# Patient Record
Sex: FEMALE | Race: WHITE | NOT HISPANIC OR LATINO | Employment: FULL TIME | URBAN - METROPOLITAN AREA
[De-identification: names, ages, dates, MRNs, and addresses within clinical notes are randomized per-mention and may not be internally consistent; named-entity substitution may affect disease eponyms.]

---

## 2018-09-20 ENCOUNTER — APPOINTMENT (OUTPATIENT)
Dept: URGENT CARE | Facility: MEDICAL CENTER | Age: 63
End: 2018-09-20
Payer: OTHER MISCELLANEOUS

## 2018-09-20 ENCOUNTER — APPOINTMENT (OUTPATIENT)
Dept: RADIOLOGY | Facility: MEDICAL CENTER | Age: 63
End: 2018-09-20
Payer: OTHER MISCELLANEOUS

## 2018-09-20 DIAGNOSIS — M25.462 PAIN AND SWELLING OF LEFT KNEE: Primary | ICD-10-CM

## 2018-09-20 DIAGNOSIS — M25.562 PAIN AND SWELLING OF LEFT KNEE: ICD-10-CM

## 2018-09-20 DIAGNOSIS — M25.462 PAIN AND SWELLING OF LEFT KNEE: ICD-10-CM

## 2018-09-20 DIAGNOSIS — M25.562 PAIN AND SWELLING OF LEFT KNEE: Primary | ICD-10-CM

## 2018-09-20 PROCEDURE — 73564 X-RAY EXAM KNEE 4 OR MORE: CPT

## 2018-09-20 PROCEDURE — 99284 EMERGENCY DEPT VISIT MOD MDM: CPT | Performed by: NURSE PRACTITIONER

## 2018-09-20 PROCEDURE — G0383 LEV 4 HOSP TYPE B ED VISIT: HCPCS | Performed by: NURSE PRACTITIONER

## 2018-09-24 ENCOUNTER — APPOINTMENT (OUTPATIENT)
Dept: URGENT CARE | Facility: MEDICAL CENTER | Age: 63
End: 2018-09-24
Payer: OTHER MISCELLANEOUS

## 2018-09-24 PROCEDURE — 99214 OFFICE O/P EST MOD 30 MIN: CPT | Performed by: NURSE PRACTITIONER

## 2018-10-04 ENCOUNTER — APPOINTMENT (OUTPATIENT)
Dept: URGENT CARE | Facility: MEDICAL CENTER | Age: 63
End: 2018-10-04
Payer: OTHER MISCELLANEOUS

## 2018-10-04 PROCEDURE — 99213 OFFICE O/P EST LOW 20 MIN: CPT | Performed by: NURSE PRACTITIONER

## 2022-01-25 ENCOUNTER — CONSULT (OUTPATIENT)
Dept: CARDIOLOGY CLINIC | Facility: CLINIC | Age: 67
End: 2022-01-25
Payer: MEDICARE

## 2022-01-25 VITALS
WEIGHT: 140.2 LBS | SYSTOLIC BLOOD PRESSURE: 144 MMHG | DIASTOLIC BLOOD PRESSURE: 80 MMHG | HEIGHT: 62 IN | HEART RATE: 107 BPM | BODY MASS INDEX: 25.8 KG/M2

## 2022-01-25 DIAGNOSIS — R00.2 PALPITATION: ICD-10-CM

## 2022-01-25 DIAGNOSIS — E66.3 OVERWEIGHT: ICD-10-CM

## 2022-01-25 DIAGNOSIS — I47.1 SVT (SUPRAVENTRICULAR TACHYCARDIA) (HCC): ICD-10-CM

## 2022-01-25 DIAGNOSIS — G47.30 SLEEP APNEA, UNSPECIFIED TYPE: ICD-10-CM

## 2022-01-25 DIAGNOSIS — R07.89 CHEST TIGHTNESS: ICD-10-CM

## 2022-01-25 DIAGNOSIS — Z79.01 CURRENT USE OF LONG TERM ANTICOAGULATION: ICD-10-CM

## 2022-01-25 DIAGNOSIS — I48.0 PAROXYSMAL ATRIAL FIBRILLATION (HCC): ICD-10-CM

## 2022-01-25 DIAGNOSIS — G47.33 OBSTRUCTIVE SLEEP APNEA: ICD-10-CM

## 2022-01-25 DIAGNOSIS — I10 PRIMARY HYPERTENSION: ICD-10-CM

## 2022-01-25 DIAGNOSIS — Q21.1 PFO (PATENT FORAMEN OVALE): ICD-10-CM

## 2022-01-25 DIAGNOSIS — E78.2 MIXED HYPERLIPIDEMIA: ICD-10-CM

## 2022-01-25 DIAGNOSIS — I47.1 SVT (SUPRAVENTRICULAR TACHYCARDIA) (HCC): Primary | ICD-10-CM

## 2022-01-25 PROBLEM — Q21.12 PFO (PATENT FORAMEN OVALE): Status: ACTIVE | Noted: 2022-01-25

## 2022-01-25 PROBLEM — I47.10 SVT (SUPRAVENTRICULAR TACHYCARDIA): Status: ACTIVE | Noted: 2022-01-25

## 2022-01-25 PROCEDURE — 99205 OFFICE O/P NEW HI 60 MIN: CPT | Performed by: INTERNAL MEDICINE

## 2022-01-25 PROCEDURE — 93000 ELECTROCARDIOGRAM COMPLETE: CPT | Performed by: INTERNAL MEDICINE

## 2022-01-25 RX ORDER — LISINOPRIL 20 MG/1
20 TABLET ORAL DAILY
COMMUNITY
End: 2022-07-18

## 2022-01-25 RX ORDER — DILTIAZEM HYDROCHLORIDE 120 MG/1
CAPSULE, COATED, EXTENDED RELEASE ORAL
COMMUNITY
Start: 2022-01-12 | End: 2022-01-25 | Stop reason: ALTCHOICE

## 2022-01-25 RX ORDER — CHLORTHALIDONE 25 MG/1
12.5 TABLET ORAL DAILY
COMMUNITY
Start: 2022-01-12 | End: 2023-01-12

## 2022-01-25 RX ORDER — DOCUSATE SODIUM 100 MG/1
100 CAPSULE, LIQUID FILLED ORAL 2 TIMES DAILY
COMMUNITY
End: 2022-07-18

## 2022-01-25 RX ORDER — NICOTINE POLACRILEX 4 MG/1
40 GUM, CHEWING ORAL DAILY
COMMUNITY

## 2022-01-25 RX ORDER — METOPROLOL SUCCINATE 50 MG/1
50 TABLET, EXTENDED RELEASE ORAL 2 TIMES DAILY
Qty: 180 TABLET | Refills: 3 | Status: SHIPPED | OUTPATIENT
Start: 2022-01-25

## 2022-01-25 NOTE — PROGRESS NOTES
Consultation - Electrophysiology-Cardiology (EP)   Rachael Castellano 73 y o  female MRN: [id number]  Unit/Bed#:  Encounter: [id number]      1  Paroxysmal atrial fibrillation (HCC)     2  Palpitation  POCT ECG    Home Study    NM myocardial perfusion spect (rx stress and/or rest)   3  SVT (supraventricular tachycardia) ([address] )     4  Sleep apnea, unspecified type  Home Study    NM myocardial perfusion spect (rx stress and/or rest)   5  Chest tightness  NM myocardial perfusion spect (rx stress and/or rest)   6  Primary hypertension     7  PFO (patent foramen ovale)     8  Current use of long term anticoagulation     9  Mixed hyperlipidemia     10  Overweight     11   Obstructive sleep apnea           Consults  Physician Requesting Consult: Self, Referral , originally Dr Sariah Baeza from [facility] THE [facility]  Reason for Consult / Principal Problem:  Management of atrial fibrillation      Assessment/Plan   Palpitation , history of SVT  Primary Hypertension   AFib  Long-term anticoagulation, chads Vasc 3 and is on Eliquis  Mixed hyperlipidemia   PFO  Overweight  Obstructive sleep apnea          Atrial fibrillation  Long-term anticoagulation  Symptoms present for -approximately 6 months  Diagnosis is based on cardia, from prior cardiologist Dr Jose C Chan  I do not have access to the strips    Risk factors   Age-66  Obesity-BMI 25  Obstructive sleep apnea -not been tested  Thyroid disorder -check TSH  Hypertension -present, on medication  Heart failure -systolic function retained  Diabetes mellitus -check hemoglobin A1c  Tobacco use-never smoker  Alcohol use-rare social  Energy drink use-none    Current therapy   Anticoagulation-chads Vasc ezlyb-177-xbgu-old female with hypertension-on Xarelto  Rate control-starting metoprolol succinate 50 mg 2 times daily  Rhythm control-none    My recommendation for this patient  Start Xarelto 15 mg once daily   Stop diltiazem ; Start metoprolol succinate 50 mg 2 times daily  Home sleep study   Nuclear stress study to rule out any underlying ischemia-chest tightness and pressure during palpitation   If patient is negative for ischemia can start flecainide 100 mg 2 times daily              SVT   2016, resulted in ER visit, Terminated by Valsalva maneuver  Was well controlled on diltiazem - medication was discontinued as it was too expensive  Recurrence after the same  Patient is being started on metoprolol 50 mg 2 times daily      Obstructive sleep apnea   Occasional snoring, rare morning fatigue  Close association of atrial arrhythmias with sleep apnea  Recommend home sleep study      Hypertension   Blood pressure-144/80  Medication-metoprolol succinate, lisinopril, chlorthalidone  My recommendation-metoprolol succinate is new medication, will need to review blood pressure with primary to see if control is better      Mixed hyperlipidemia  Prior records show use of atorvastatin  No myositis or myalgia   Need to confirm with patient if She is still taking the same      Patent foramen ovale  Small   Not seen on echo in 2019        Summary of my recommendation for the patient   Start Xarelto 15 mg once daily   Stop diltiazem   Start metoprolol succinate 50 mg 2 times daily  Home sleep study   Nuclear stress study to rule out any underlying ischemia-chest tightness and pressure during palpitation          History of Present Illness   HPI: Didi Salas is a 73y o  year old female has been referred to me by PCP for the evaluation of SVT and atrial fibrillation    The patient has significant medical illnesses which include  Palpitation , history of SVT  Primary Hypertension   AFib  Long-term anticoagulation, chads Vasc 3 and is on Eliquis  Mixed hyperlipidemia   PFO  Overweight  Obstructive sleep apnea    Notes from prior cardiologist, Dr Richard Baker was reviewed  A) As far as atrial fibrillation is concerned, the patient follows with a Kardia   June 2021-AFib  January 2022-AFib  B) As far as SVT is concerned  2016, Patient was in the ED with rapid heart rate in the 240s, terminated with vagal maneuver  She was started on diltiazem with good control of symptoms  Diltiazem was too expensive and patient was switched to amlodipine in August 2020  She was having more frequent palpitations on amlodipine      As far as cardiac symptoms are concerned  Angina -occasional chest pain and pressure on exertion  Orthopnea -negative  Paroxysmal nocturnal dyspnea -negative  Leg swelling-some swelling while on amlodipine  Palpitations -present  Presyncope-rare  Syncope -negative  Orthostatic lightheadedness -occasional  Exertional intolerance-rare      Snoring-unsure  morning fatigue-occasional  Daytime sleepiness-rare      Social history  Tobacco use-never smoker  Alcohol use-rare social  Energy drink use-never used  Recreational drug use-never used      Family history  Hypertension    Historical Information   History reviewed  No pertinent past medical history  History reviewed  No pertinent surgical history    Social History     Substance and Sexual Activity   Alcohol Use Not Currently     Social History     Substance and Sexual Activity   Drug Use Not Currently     Social History     Tobacco Use   Smoking Status Never Smoker   Smokeless Tobacco Never Used     Social History     Socioeconomic History    Marital status: /Civil Union     Spouse name: Not on file    Number of children: Not on file    Years of education: Not on file    Highest education level: Not on file   Occupational History    Not on file   Tobacco Use    Smoking status: Never Smoker    Smokeless tobacco: Never Used   Vaping Use    Vaping Use: Never used   Substance and Sexual Activity    Alcohol use: Not Currently    Drug use: Not Currently    Sexual activity: Not on file   Other Topics Concern    Not on file   Social History Narrative    Not on file     Social Determinants of Health     Financial Resource Strain: Not on file   Food Insecurity: Not on file   Transportation Needs: Not on file   Physical Activity: Not on file   Stress: Not on file   Social Connections: Not on file   Intimate Partner Violence: Not on file   Housing Stability: Not on file       Family History:History reviewed  No pertinent family history  Meds/Allergies      No current facility-administered medications for this visit  (Not in a hospital admission)      Allergies   Allergen Reactions    Peanut Oil - Food Allergy GI Intolerance and Rash     flushed           Objective   Vitals:   Visit Vitals  /80 (BP Location: Left arm, Patient Position: Sitting, Cuff Size: Adult)   Pulse (!) 107   Ht 5' 2" (1 575 m)   Wt 63 6 kg (140 lb 3 2 oz)   BMI 25 64 kg/m²   Smoking Status Never Smoker   BSA 1 64 m²      Vitals:    01/25/22 1320   Weight: 63 6 kg (140 lb 3 2 oz)   [unfilled]    Invasive Devices  Report    None                   ROS  Review of Systems   All other systems reviewed and are negative  As described in my history of present illness        PHYSICAL EXAM  Physical Exam  Vitals reviewed  Constitutional:       General: She is not in acute distress  Appearance: Normal appearance  She is not ill-appearing  HENT:      Head: Normocephalic and atraumatic  Right Ear: External ear normal       Left Ear: External ear normal       Nose: Nose normal    Eyes:      General: No scleral icterus  Extraocular Movements: Extraocular movements intact  Conjunctiva/sclera: Conjunctivae normal       Pupils: Pupils are equal, round, and reactive to light  Cardiovascular:      Rate and Rhythm: Normal rate and regular rhythm  Pulses: Normal pulses  Heart sounds: Normal heart sounds  No murmur heard  Pulmonary:      Effort: Pulmonary effort is normal  No respiratory distress  Breath sounds: Normal breath sounds  No wheezing  Abdominal:      General: Bowel sounds are normal  There is no distension  Tenderness: There is no abdominal tenderness     Musculoskeletal: General: No swelling, tenderness or deformity  Cervical back: No rigidity  Skin:     Coloration: Skin is not jaundiced  Findings: No bruising  Neurological:      Mental Status: She is alert  Mental status is at baseline  Motor: No weakness  Psychiatric:         Mood and Affect: Mood normal          Thought Content: Thought content normal                LAB RESULTS:              Imaging:  Echocardiogram- 2D  1-    Narrative      Left Ventricle: Systolic function is normal with an ejection fraction   of 60-65%    Tricuspid Valve: There is mild regurgitation    Pulmonic Valve: The estimated pulmonary artery systolic pressure is   54 4 mmHg  Left Ventricle   Left ventricle is normal in size  Wall thickness is normal  Systolic function is normal with an ejection fraction of 60-65%  Wall motion is within normal limits  There is normal diastolic function  Right Ventricle   Right ventricle cavity is normal  Systolic function is normal      Left Atrium   Left atrium cavity size is normal      Right Atrium   Right atrium cavity is normal      IVC/SVC   The inferior vena cava demonstrates a diameter of <=21 mm and collapses >50%; therefore, the right atrial pressure is estimated at 0-5 mmHg  Mitral Valve   Mitral valve structure is normal  There is no regurgitation or stenosis  Tricuspid Valve   Tricuspid valve structure is normal  There is mild regurgitation  There is no evidence of tricuspid valve stenosis  Aortic Valve   The aortic valve is trileaflet  There is no regurgitation or stenosis  Pulmonic Valve   Pulmonic valve structure is normal  There is no regurgitation or stenosis  The estimated pulmonary artery systolic pressure is 51 8 mmHg  Ascending Aorta   The aorta appears normal in size  Pericardium   There is no pericardial effusion  Stress Test:   No results found for this or any previous visit          Cardiac catheterization :  No results found for this or any previous visit  DEVICE CHECK:       No results found for this or any previous visit          Code Status: [unfilled]  Advance Directive and Living Will:      Power of :    POLST:      Counseling / Coordination of Care  Detailed discussion done about initiation of therapy for SVT, atrial fibrillation      Yokasta Hawkins MD

## 2022-01-25 NOTE — PATIENT INSTRUCTIONS
STOP THE DILTIAZEM     START METOPROLOL SUCCIANTE 50 MG BID    WE WILL ORDER NUCLEAR STRESS TEST AND HOME SLEEP STUDY '    FOLLOW UP IN 4 MONTHS

## 2022-01-25 NOTE — LETTER
January 30, 2022     Jacquie Marshall, [address]  [address] [facility]    Patient: Chacorta Sparrow   YOB: 1955   Date of Visit: 1/25/2022       Dear Dr Maryjane Quarles: Thank you for referring Marcia Bower to me for evaluation  Below are my notes for this consultation  If you have questions, please do not hesitate to call me  I look forward to following your patient along with you  Sincerely,        Spencer Uriostegui MD        CC: Colin Oliveira MD  1/30/2022 10:28 AM  Sign when Signing Visit   Consultation - Electrophysiology-Cardiology (EP)   Chacorta Sparrow 73 y o  female MRN: [id number]  Unit/Bed#:  Encounter: [id number]      1  Paroxysmal atrial fibrillation (HCC)     2  Palpitation  POCT ECG    Home Study    NM myocardial perfusion spect (rx stress and/or rest)   3  SVT (supraventricular tachycardia) ([address] )     4  Sleep apnea, unspecified type  Home Study    NM myocardial perfusion spect (rx stress and/or rest)   5  Chest tightness  NM myocardial perfusion spect (rx stress and/or rest)   6  Primary hypertension     7  PFO (patent foramen ovale)     8  Current use of long term anticoagulation     9  Mixed hyperlipidemia     10  Overweight     11   Obstructive sleep apnea           Consults  Physician Requesting Consult: Self, Referral , originally Dr Kari Cortez from [facility] THE [facility]  Reason for Consult / Principal Problem:  Management of atrial fibrillation      Assessment/Plan   Palpitation , history of SVT  Primary Hypertension   AFib  Long-term anticoagulation, chads Vasc 3 and is on Eliquis  Mixed hyperlipidemia   PFO  Overweight  Obstructive sleep apnea          Atrial fibrillation  Long-term anticoagulation  Symptoms present for -approximately 6 months  Diagnosis is based on cardia, from prior cardiologist Dr Anthony Sam  I do not have access to the strips    Risk factors   Age-66  Obesity-BMI 25  Obstructive sleep apnea -not been tested  Thyroid disorder -check TSH  Hypertension -present, on medication  Heart failure -systolic function retained  Diabetes mellitus -check hemoglobin A1c  Tobacco use-never smoker  Alcohol use-rare social  Energy drink use-none    Current therapy   Anticoagulation-chads Vasc oowfc-89-sdhc-old female with hypertension-on Xarelto  Rate control-starting metoprolol succinate 50 mg 2 times daily  Rhythm control-none    My recommendation for this patient  Start Xarelto 15 mg once daily   Stop diltiazem ; Start metoprolol succinate 50 mg 2 times daily  Home sleep study   Nuclear stress study to rule out any underlying ischemia-chest tightness and pressure during palpitation   If patient is negative for ischemia can start flecainide 100 mg 2 times daily              SVT   2016, resulted in ER visit, Terminated by Valsalva maneuver  Was well controlled on diltiazem - medication was discontinued as it was too expensive  Recurrence after the same  Patient is being started on metoprolol 50 mg 2 times daily      Obstructive sleep apnea   Occasional snoring, rare morning fatigue  Close association of atrial arrhythmias with sleep apnea  Recommend home sleep study      Hypertension   Blood pressure-144/80  Medication-metoprolol succinate, lisinopril, chlorthalidone  My recommendation-metoprolol succinate is new medication, will need to review blood pressure with primary to see if control is better      Mixed hyperlipidemia  Prior records show use of atorvastatin  No myositis or myalgia   Need to confirm with patient if She is still taking the same      Patent foramen ovale  Small   Not seen on echo in 2019        Summary of my recommendation for the patient   Start Xarelto 15 mg once daily   Stop diltiazem   Start metoprolol succinate 50 mg 2 times daily  Home sleep study   Nuclear stress study to rule out any underlying ischemia-chest tightness and pressure during palpitation          History of Present Illness   HPI: Bhavani Boswell is a 73y o  year old female has been referred to me by PCP for the evaluation of SVT and atrial fibrillation    The patient has significant medical illnesses which include  Palpitation , history of SVT  Primary Hypertension   AFib  Long-term anticoagulation, chads Vasc 3 and is on Eliquis  Mixed hyperlipidemia   PFO  Overweight  Obstructive sleep apnea    Notes from prior cardiologist, Dr Geneva Sims was reviewed  A) As far as atrial fibrillation is concerned, the patient follows with a Kardia   June 2021-AFib  January 2022-AFib  B) As far as SVT is concerned  2016, Patient was in the ED with rapid heart rate in the 240s, terminated with vagal maneuver  She was started on diltiazem with good control of symptoms  Diltiazem was too expensive and patient was switched to amlodipine in August 2020  She was having more frequent palpitations on amlodipine      As far as cardiac symptoms are concerned  Angina -occasional chest pain and pressure on exertion  Orthopnea -negative  Paroxysmal nocturnal dyspnea -negative  Leg swelling-some swelling while on amlodipine  Palpitations -present  Presyncope-rare  Syncope -negative  Orthostatic lightheadedness -occasional  Exertional intolerance-rare      Snoring-unsure  morning fatigue-occasional  Daytime sleepiness-rare      Social history  Tobacco use-never smoker  Alcohol use-rare social  Energy drink use-never used  Recreational drug use-never used      Family history  Hypertension    Historical Information   History reviewed  No pertinent past medical history  History reviewed  No pertinent surgical history    Social History     Substance and Sexual Activity   Alcohol Use Not Currently     Social History     Substance and Sexual Activity   Drug Use Not Currently     Social History     Tobacco Use   Smoking Status Never Smoker   Smokeless Tobacco Never Used     Social History     Socioeconomic History    Marital status: /Civil Union     Spouse name: Not on file    Number of children: Not on file    Years of education: Not on file    Highest education level: Not on file   Occupational History    Not on file   Tobacco Use    Smoking status: Never Smoker    Smokeless tobacco: Never Used   Vaping Use    Vaping Use: Never used   Substance and Sexual Activity    Alcohol use: Not Currently    Drug use: Not Currently    Sexual activity: Not on file   Other Topics Concern    Not on file   Social History Narrative    Not on file     Social Determinants of Health     Financial Resource Strain: Not on file   Food Insecurity: Not on file   Transportation Needs: Not on file   Physical Activity: Not on file   Stress: Not on file   Social Connections: Not on file   Intimate Partner Violence: Not on file   Housing Stability: Not on file       Family History:History reviewed  No pertinent family history  Meds/Allergies      No current facility-administered medications for this visit  (Not in a hospital admission)      Allergies   Allergen Reactions    Peanut Oil - Food Allergy GI Intolerance and Rash     flushed           Objective   Vitals:   Visit Vitals  /80 (BP Location: Left arm, Patient Position: Sitting, Cuff Size: Adult)   Pulse (!) 107   Ht 5' 2" (1 575 m)   Wt 63 6 kg (140 lb 3 2 oz)   BMI 25 64 kg/m²   Smoking Status Never Smoker   BSA 1 64 m²      Vitals:    01/25/22 1320   Weight: 63 6 kg (140 lb 3 2 oz)   [unfilled]    Invasive Devices  Report    None                   ROS  Review of Systems   All other systems reviewed and are negative  As described in my history of present illness        PHYSICAL EXAM  Physical Exam  Vitals reviewed  Constitutional:       General: She is not in acute distress  Appearance: Normal appearance  She is not ill-appearing  HENT:      Head: Normocephalic and atraumatic  Right Ear: External ear normal       Left Ear: External ear normal       Nose: Nose normal    Eyes:      General: No scleral icterus  Extraocular Movements: Extraocular movements intact  Conjunctiva/sclera: Conjunctivae normal       Pupils: Pupils are equal, round, and reactive to light  Cardiovascular:      Rate and Rhythm: Normal rate and regular rhythm  Pulses: Normal pulses  Heart sounds: Normal heart sounds  No murmur heard  Pulmonary:      Effort: Pulmonary effort is normal  No respiratory distress  Breath sounds: Normal breath sounds  No wheezing  Abdominal:      General: Bowel sounds are normal  There is no distension  Tenderness: There is no abdominal tenderness  Musculoskeletal:         General: No swelling, tenderness or deformity  Cervical back: No rigidity  Skin:     Coloration: Skin is not jaundiced  Findings: No bruising  Neurological:      Mental Status: She is alert  Mental status is at baseline  Motor: No weakness  Psychiatric:         Mood and Affect: Mood normal          Thought Content: Thought content normal                LAB RESULTS:              Imaging:  Echocardiogram- 2D  1-    Narrative      Left Ventricle: Systolic function is normal with an ejection fraction   of 60-65%    Tricuspid Valve: There is mild regurgitation    Pulmonic Valve: The estimated pulmonary artery systolic pressure is   78 1 mmHg  Left Ventricle   Left ventricle is normal in size  Wall thickness is normal  Systolic function is normal with an ejection fraction of 60-65%  Wall motion is within normal limits  There is normal diastolic function  Right Ventricle   Right ventricle cavity is normal  Systolic function is normal      Left Atrium   Left atrium cavity size is normal      Right Atrium   Right atrium cavity is normal      IVC/SVC   The inferior vena cava demonstrates a diameter of <=21 mm and collapses >50%; therefore, the right atrial pressure is estimated at 0-5 mmHg  Mitral Valve   Mitral valve structure is normal  There is no regurgitation or stenosis       Tricuspid Valve   Tricuspid valve structure is normal  There is mild regurgitation  There is no evidence of tricuspid valve stenosis  Aortic Valve   The aortic valve is trileaflet  There is no regurgitation or stenosis  Pulmonic Valve   Pulmonic valve structure is normal  There is no regurgitation or stenosis  The estimated pulmonary artery systolic pressure is 96 9 mmHg  Ascending Aorta   The aorta appears normal in size  Pericardium   There is no pericardial effusion  Stress Test:   No results found for this or any previous visit  Cardiac catheterization :  No results found for this or any previous visit  DEVICE CHECK:       No results found for this or any previous visit          Code Status: @[facility]@  Advance Directive and Living Will:      Power of :    POLST:      Counseling / Coordination of Care  Detailed discussion done about initiation of therapy for SVT, atrial fibrillation      Vidal Hartmann MD

## 2022-01-25 NOTE — TELEPHONE ENCOUNTER
STOP THE DILTIAZEM     START METOPROLOL SUCCIANTE 50 MG BID    START XARELTO 15MG ONCE A DAY    WE WILL ORDER NUCLEAR STRESS TEST AND HOME SLEEP STUDY '    FOLLOW UP IN 4 MONTHS

## 2022-01-28 ENCOUNTER — TELEPHONE (OUTPATIENT)
Dept: CARDIOLOGY CLINIC | Facility: CLINIC | Age: 67
End: 2022-01-28

## 2022-02-03 ENCOUNTER — TELEPHONE (OUTPATIENT)
Dept: SLEEP CENTER | Facility: CLINIC | Age: 67
End: 2022-02-03

## 2022-02-03 NOTE — TELEPHONE ENCOUNTER
----- Message from Geri Kim DO sent at 1/28/2022 11:57 PM EST -----  approved  ----- Message -----  From: Ronda Barry  Sent: 1/27/2022  12:38 PM EST  To: Sleep Medicine [place] Provider    This sleep study needs approval      If approved please sign and return to clerical pool  If denied please include reasons why  Also provide alternative testing if warranted  Please sign and return to clerical pool

## 2022-02-09 ENCOUNTER — HOSPITAL ENCOUNTER (OUTPATIENT)
Dept: NUCLEAR MEDICINE | Facility: HOSPITAL | Age: 67
Discharge: HOME | End: 2022-02-09
Attending: INTERNAL MEDICINE
Payer: MEDICARE

## 2022-02-09 ENCOUNTER — HOSPITAL ENCOUNTER (OUTPATIENT)
Dept: NON INVASIVE DIAGNOSTICS | Facility: HOSPITAL | Age: 67
Discharge: HOME | End: 2022-02-09
Attending: INTERNAL MEDICINE
Payer: MEDICARE

## 2022-02-09 DIAGNOSIS — R00.2 PALPITATION: ICD-10-CM

## 2022-02-09 DIAGNOSIS — R07.89 CHEST TIGHTNESS: ICD-10-CM

## 2022-02-09 DIAGNOSIS — G47.30 SLEEP APNEA, UNSPECIFIED TYPE: ICD-10-CM

## 2022-02-09 LAB
BASELINE ST DEPRESSION: 0 MM
NUC STRESS EJECTION FRACTION: 81 %
RATE PRESSURE PRODUCT: NORMAL
SL CV REST NUCLEAR ISOTOPE DOSE: 10.8 MCI
SL CV STRESS NUCLEAR ISOTOPE DOSE: 31.7 MCI
SL CV STRESS RECOVERY BP: NORMAL MMHG
SL CV STRESS RECOVERY HR: 104 BPM
SL CV STRESS RECOVERY O2 SAT: 98 %
STRESS ANGINA INDEX: 0
STRESS BASELINE BP: NORMAL MMHG
STRESS BASELINE HR: 83 BPM
STRESS O2 SAT REST: 98 %
STRESS PEAK HR: 122 BPM
STRESS POST O2 SAT PEAK: 98 %
STRESS POST PEAK BP: 162 MMHG
STRESS ST DEPRESSION: 0 MM
STRESS/REST PERFUSION RATIO: 0.98

## 2022-02-09 PROCEDURE — 78452 HT MUSCLE IMAGE SPECT MULT: CPT

## 2022-02-09 PROCEDURE — 93017 CV STRESS TEST TRACING ONLY: CPT

## 2022-02-09 PROCEDURE — G1004 CDSM NDSC: HCPCS

## 2022-02-09 PROCEDURE — 93018 CV STRESS TEST I&R ONLY: CPT

## 2022-02-09 PROCEDURE — A9502 TC99M TETROFOSMIN: HCPCS

## 2022-02-09 PROCEDURE — 93016 CV STRESS TEST SUPVJ ONLY: CPT

## 2022-02-09 RX ADMIN — REGADENOSON 0.4 MG: 0.08 INJECTION, SOLUTION INTRAVENOUS at 10:20

## 2022-02-21 LAB
ARRHY DURING EX: NORMAL
CHEST PAIN STATEMENT: NORMAL
MAX DIASTOLIC BP: 80 MMHG
MAX HEART RATE: 122 BPM
MAX PREDICTED HEART RATE: 155 BPM
MAX. SYSTOLIC BP: 162 MMHG
PROTOCOL NAME: NORMAL
REASON FOR TERMINATION: NORMAL
TARGET HR FORMULA: NORMAL
TEST INDICATION: NORMAL
TIME IN EXERCISE PHASE: NORMAL

## 2022-02-22 ENCOUNTER — TELEPHONE (OUTPATIENT)
Dept: CARDIOLOGY CLINIC | Facility: CLINIC | Age: 67
End: 2022-02-22

## 2022-02-22 NOTE — TELEPHONE ENCOUNTER
----- Message from Ival Juan Jose, ЕКАТЕРИНА sent at 2/22/2022  1:39 PM EST -----  I am not sure if this patient already got these results, but her recent stress test was read normal  Thanks!

## 2022-03-24 NOTE — TELEPHONE ENCOUNTER
Per Dr Uzair Quevedo    This patient has normal stress test   Please start the patient on flecainide 100 mg twice daily   Follow-up with an EKG in a week's time to look for any QRS widening         Spoke with patient she stated she is feeling much better at this time and she feels she does not want to start a new medications  Patient stated she has an apple watch and she is taking ekgs on it all the time and she is doing much better so she will call our office if she starts to have more issues  Notified Dr Uzair Quevedo of this information and he said for patient to call us with any issues

## 2022-03-30 ENCOUNTER — HOSPITAL ENCOUNTER (OUTPATIENT)
Dept: SLEEP CENTER | Facility: CLINIC | Age: 67
Discharge: HOME | End: 2022-03-30
Payer: MEDICARE

## 2022-03-30 DIAGNOSIS — G47.30 SLEEP APNEA, UNSPECIFIED TYPE: ICD-10-CM

## 2022-03-30 DIAGNOSIS — R00.2 PALPITATION: ICD-10-CM

## 2022-03-30 PROCEDURE — G0399 HOME SLEEP TEST/TYPE 3 PORTA: HCPCS

## 2022-03-31 PROCEDURE — 95806 SLEEP STUDY UNATT&RESP EFFT: CPT | Performed by: INTERNAL MEDICINE

## 2022-03-31 NOTE — PROGRESS NOTES
Home Sleep Study Documentation    Pre-Sleep Home Study:    Set-up and instructions performed by: EDIL/TAWNYA    Technician performed demonstration for Patient: yes    Return demonstration performed by Patient: yes    Written instructions provided to Patient: yes    Patient signed consent form: yes        Post-Sleep Home Study:    Additional comments by Patient: None    Home Sleep Study Failed:no:    Failure reason: N/A    Reported or Detected: N/A    Scored by: WIL Martin

## 2022-04-04 ENCOUNTER — TELEPHONE (OUTPATIENT)
Dept: SLEEP CENTER | Facility: CLINIC | Age: 67
End: 2022-04-04

## 2022-04-04 NOTE — TELEPHONE ENCOUNTER
Left message for the patient to call back for sleep study results  Sleep study shows mild KRISTY   Patient needs to schedule consult

## 2022-04-25 ENCOUNTER — OFFICE VISIT (OUTPATIENT)
Dept: SLEEP CENTER | Facility: CLINIC | Age: 67
End: 2022-04-25
Payer: MEDICARE

## 2022-04-25 VITALS
DIASTOLIC BLOOD PRESSURE: 63 MMHG | BODY MASS INDEX: 25.76 KG/M2 | HEART RATE: 44 BPM | HEIGHT: 62 IN | SYSTOLIC BLOOD PRESSURE: 139 MMHG | WEIGHT: 140 LBS

## 2022-04-25 DIAGNOSIS — G47.33 OBSTRUCTIVE SLEEP APNEA: Primary | ICD-10-CM

## 2022-04-25 DIAGNOSIS — I10 PRIMARY HYPERTENSION: ICD-10-CM

## 2022-04-25 DIAGNOSIS — I48.0 PAROXYSMAL ATRIAL FIBRILLATION (HCC): ICD-10-CM

## 2022-04-25 DIAGNOSIS — G47.33 OSA (OBSTRUCTIVE SLEEP APNEA): ICD-10-CM

## 2022-04-25 PROCEDURE — 99204 OFFICE O/P NEW MOD 45 MIN: CPT | Performed by: PSYCHIATRY & NEUROLOGY

## 2022-04-25 NOTE — ASSESSMENT & PLAN NOTE
Ms Dolores Rubio and I reviewed her home sleep test in detail-the test is consistent with a diagnosis of mild obstructive sleep apnea  Given her history of hypertension and paroxysmal atrial fibrillation, I would recommend treatment  We reviewed treatment options and she is open to a trial of CPAP at home  A CPAP machine will be ordered for home use today  We discussed that even though she does not have snoring, she identifies daytime tiredness as well as nocturia which are symptoms that may improve with CPAP treatment  If CPAP is not tolerated, an oral appliance would also be a consideration

## 2022-04-25 NOTE — PROGRESS NOTES
Assessment/Plan:      1  Obstructive sleep apnea  Assessment & Plan:  Ms Jagdish Valderrama and I reviewed her home sleep test in detail-the test is consistent with a diagnosis of mild obstructive sleep apnea  Given her history of hypertension and paroxysmal atrial fibrillation, I would recommend treatment  We reviewed treatment options and she is open to a trial of CPAP at home  A CPAP machine will be ordered for home use today  We discussed that even though she does not have snoring, she identifies daytime tiredness as well as nocturia which are symptoms that may improve with CPAP treatment  If CPAP is not tolerated, an oral appliance would also be a consideration  2  KRISTY (obstructive sleep apnea)  -     CPAP Auto New DME    3  Primary hypertension  -     CPAP Auto New DME    4  Paroxysmal atrial fibrillation (HCC)  -     CPAP Auto New DME         Subjective:      Patient ID: Gilda Anderson is a 73 y o  female  This is a 52-year-old woman who is referred as a new patient to assess for obstructive sleep apnea  She has a chief complaint of tiredness during the day  She has paroxysmal atrial fibrillation, last episode 10 days ago  She is a retired   She notes 3 episodes of nocturia at night, tiredness  She is not aware of gasping, choking, witnessed apnea, tiredness  She goes to bed 9 pm, falls asleep by 930 pm  Wakes 5 times a night, 3 to urinate  Usually awake at 6 AM on her own,     Not refreshed when she wakes  She feels tired in the day  She does not nap, does not doze  She notices restless legs a few times a week when watching TV  She cannot sit still  "you cannot move"  Movement helps  Does not keep her up  Has had this for years, has not been on medication for RLS  She kicks her legs in her sleep  Does not regularly take antihistamines, does not have anemia  She notes similar symptoms in her brother though he was not diagnosed with RLS    Does not drink caffeine or alcohol  A recent home sleep test in March 2022 showed mild obstructive sleep apnea with a respiratory event index of 10  This prompted her referral to day  Of note, she has a history of paroxysmal atrial fibrillation and follows with Cardiology  [place] Sleepiness Scale:     Sitting and reading: Would never doze  Watching TV: Slight chance of dozing  Sitting, inactive in a public place (e g  a theatre or a meeting): Would never doze  As a passenger in a car for an hour without a break: Slight chance of dozing  Lying down to rest in the afternoon when circumstances permit: Moderate chance of dozing  Sitting and talking to someone: Would never doze  Sitting quietly after a lunch without alcohol: Would never doze  In a car, while stopped for a few minutes in traffic: Would never doze  Total score: 4     The following portions of the patient's history were reviewed and updated as appropriate: allergies, current medications, past family history, past medical history, past social history, past surgical history, and problem list     Review of Systems      Objective:  Neck Circumference: 14 inches      /63 (BP Location: Left arm, Patient Position: Sitting, Cuff Size: Adult)   Pulse (!) 44   Ht 5' 2" (1 575 m)   Wt 63 5 kg (140 lb)   BMI 25 61 kg/m²          Physical Exam  Constitutional:       Appearance: Normal appearance  HENT:      Head: Normocephalic and atraumatic  Mouth/Throat:      Mouth: Mucous membranes are moist       Comments: mallampati  4 airway, small dimensions to airway, no tonsillar hypertrophy   Eyes:      Extraocular Movements: Extraocular movements intact  Pupils: Pupils are equal, round, and reactive to light  Cardiovascular:      Rate and Rhythm: Normal rate  Pulses: Normal pulses  Heart sounds: Normal heart sounds  Pulmonary:      Effort: Pulmonary effort is normal       Breath sounds: Normal breath sounds  Musculoskeletal:      Right lower leg: No edema  Left lower leg: No edema  Neurological:      Mental Status: She is alert  Psychiatric:         Mood and Affect: Mood normal          Behavior: Behavior normal          Thought Content:  Thought content normal          Judgment: Judgment normal

## 2022-04-25 NOTE — PATIENT INSTRUCTIONS
Sleep Apnea   AMBULATORY CARE:   Sleep apnea  is a condition that causes you to stop breathing often during sleep  Types of sleep apnea:   · Obstructive sleep apnea (KRISTY)  is the most common kind  The muscles and tissues around your throat relax and block air from passing through  Obesity, use of alcohol or cigarettes, or a family history are common causes  KRISTY may increase your risk for complications after surgery  · Central sleep apnea (CSA)  means your brain does not send signals to the muscles that control breathing  You do not take a breath even though your airway is open  Common causes include medical conditions such as heart failure, being older than 40, or use of opioids  · Complex (or mixed) sleep apnea  means you have both obstructive and central sleep apnea  Common signs and symptoms:   · Loud snoring or long pauses in breathing    · Feeling sleepy, slow, and tired during the day    · Snorting, gasping, or choking while you sleep, and waking up suddenly because of these    · Feeling irritable during the day    · Dry mouth or a headache in the mornings    · Heavy night sweating    · A hard time thinking, remembering things, or focusing on your tasks the following day    Call your local emergency number (911 in the [address] [facility]) if:   · You have chest pain or trouble breathing  Call your doctor if:   · You have new or worsening signs or symptoms  · You have questions or concerns about your condition or care  Treatment  depends on the kind of apnea you have  · A mouth device  may be needed if you have mild sleep apnea  These are designed to keep your throat open  Ask your dentist or healthcare provider about the best mouth device for you  · A machine  may be used to help you get more air during sleep  A mask may be placed over your nose and mouth, or just your nose  The mask is hooked to the machine  You will get air through the mask      ? A continuous positive airway pressure (CPAP) machine  is used to keep your airway open during sleep  The machine blows a gentle stream of air into the mask when you breathe  This helps keep your airway open so you can breathe more regularly  Extra oxygen may be given through the machine  ? A bilevel positive airway pressure (BiPAP) machine  gives air but lowers the pressure when you breathe out  ? An adaptive servo-ventilator (ASV)  is a machine that learns your usual breathing pattern  Then, it uses pressure to give you air and prevent stops in your breathing  · Surgery  to expand your airway or remove extra tissues may be needed  Surgery is usually only considered if other treatments do not work  Manage or prevent sleep apnea:   · Reach and maintain a healthy weight  Ask your healthcare provider what a healthy weight is for you  Ask him or her to help you create a safe weight loss plan if you are overweight  Even a small goal of a 10% weight loss can improve your symptoms  · Do not smoke  Nicotine and other chemicals in cigarettes and cigars can cause lung damage  Ask your healthcare provider for information if you currently smoke and need help to quit  E-cigarettes or smokeless tobacco still contain nicotine  Talk to your healthcare provider before you use these products  · Do not drink alcohol or take sedative medicine before you go to sleep  Alcohol and sedatives can relax the muscles and tissues around your throat  This can block the airflow to your lungs  · Sleep on your side or use pillows designed to prevent sleep apnea  This prevents your tongue or other tissues from blocking your throat  You can also raise the head of your bed  Follow up with your doctor or specialist as directed: You may need to have blood tests during your follow-up visits  Work with your provider to find the right breathing support equipment and settings for you  Write down your questions so you remember to ask them during your visits    © Copyright [employer] 2022 Information is for End User's use only and may not be sold, redistributed or otherwise used for commercial purposes  All illustrations and images included in CareNotes® are the copyrighted property of [employer]  or [employer]  The above information is an  only  It is not intended as medical advice for individual conditions or treatments  Talk to your doctor, nurse or pharmacist before following any medical regimen to see if it is safe and effective for you  Nursing Support:  When: Monday through Friday 7A-5PM except holidays  Where: Our direct line is [phone]  If you are having a true emergency please call 911  In the event that the line is busy or it is after hours please leave a voice message and we will return your call  Please speak clearly, leaving your full name, birth date, best number to reach you and the reason for your call  Medication refills: We will need the name of the medication, the dosage, the ordering provider, whether you get a 30 or 90 day refill, and the pharmacy name and address  Medications will be ordered by the provider only  Nurses cannot call in prescriptions  Please allow 7 days for medication refills  Physician requested updates: If your provider requested that you call with an update after starting medication, please be ready to provide us the medication and dosage, what time you take your medication, the time you attempt to fall asleep, time you fall asleep, when you wake up, and what time you get out of bed  Sleep Study Results: We will contact you with sleep study results and/or next steps after the physician has reviewed your testing

## 2022-04-25 NOTE — PROGRESS NOTES
Review of Systems      Genitourinary need to urinate more than twice a night, hot flashes at night and post menopausal (no peroids)   Cardiology palpitations/fluttering feeling in the chest   Gastrointestinal frequent heartburn/acid reflux   Neurology need to move extremities   Constitutional fatigue   Integumentary rash or dry skin and itching   Psychiatry anxiety   Musculoskeletal joint pain, back pain, legs twitching/jerking and leg cramps   Pulmonary shortness of breath with activity and frequent cough   ENT ringing in ears   Endocrine frequent urination   Hematological none

## 2022-05-06 ENCOUNTER — TELEPHONE (OUTPATIENT)
Dept: SLEEP CENTER | Facility: CLINIC | Age: 67
End: 2022-05-06

## 2022-05-10 ENCOUNTER — TELEPHONE (OUTPATIENT)
Dept: SLEEP CENTER | Facility: CLINIC | Age: 67
End: 2022-05-10

## 2022-05-10 LAB

## 2022-07-18 ENCOUNTER — OFFICE VISIT (OUTPATIENT)
Dept: SLEEP CENTER | Facility: CLINIC | Age: 67
End: 2022-07-18
Payer: MEDICARE

## 2022-07-18 VITALS — HEART RATE: 71 BPM | DIASTOLIC BLOOD PRESSURE: 58 MMHG | SYSTOLIC BLOOD PRESSURE: 113 MMHG

## 2022-07-18 DIAGNOSIS — G25.81 RESTLESS LEGS SYNDROME (RLS): ICD-10-CM

## 2022-07-18 DIAGNOSIS — G47.33 OSA (OBSTRUCTIVE SLEEP APNEA): Primary | ICD-10-CM

## 2022-07-18 DIAGNOSIS — I48.0 PAROXYSMAL ATRIAL FIBRILLATION (HCC): ICD-10-CM

## 2022-07-18 PROCEDURE — 99213 OFFICE O/P EST LOW 20 MIN: CPT | Performed by: PSYCHIATRY & NEUROLOGY

## 2022-07-18 RX ORDER — LORATADINE 10 MG/1
10 CAPSULE, LIQUID FILLED ORAL 2 TIMES DAILY
COMMUNITY
End: 2022-08-30

## 2022-07-18 RX ORDER — LISINOPRIL 10 MG/1
TABLET ORAL
COMMUNITY
Start: 2022-06-23

## 2022-07-18 RX ORDER — ATORVASTATIN CALCIUM 40 MG/1
TABLET, FILM COATED ORAL
COMMUNITY
Start: 2022-07-02

## 2022-07-18 NOTE — PROGRESS NOTES
Assessment/Plan:      1  KRISTY (obstructive sleep apnea)  Ms Tea Barber is doing great, she is consistently using her CPAP machine and her AHI is normal with treatment  Treatment is been very beneficial   She has met Medicare is compliance guidelines  We reviewed the need for continued CPAP use which she is motivated to do  She had some nasal irritation, we discussed that once this heals, in the future she can use over-the-counter nasal saline gel to mitigate this  2  Restless legs syndrome (RLS)  -     Iron Panel (Includes Ferritin, Iron Sat%, Iron, and TIBC); Future    3  Paroxysmal atrial fibrillation (HCC)   -     Iron Panel (Includes Ferritin, Iron Sat%, Iron, and TIBC); Future             Subjective:      Patient ID: Stephanie Mandujano is a 73 y o  female  This is a 62-year-old woman who returns in follow-up for a history of moderate obstructive sleep apnea  She started auto-CPAP since her last visit  Since starting CPAP, she has noticed improvement  She reports using the machine each night  She is now refreshed when she wakes, she wakes up less, and now remembers her dreams   She does not get feedback from  regarding snoring, she notes he also uses a CPAP machine for severe obstructive sleep apnea    She goes to bed at 9:00 p m , is asleep in 15 minutes, and is awake at 6:30 a m    Wakes 2-3 times a night, not up for a long time    Has not had recurrent episodes of atrial fibrillation for a few months     CPAP data reviewed today  ResMed AirSense 11 set at 5-15 cm H2O  Average session 8 hours 11 minutes  AHI is 1 3  Used 30/30 nights, 100% more than 4 hours  95% pressure-10 cm H2O  Uses under nose nasal cushion    Had a sore in her nostril- was given mupirocin which has helped  No nose bleeds  occ dry mouth  No nasal congestion     She has symptoms of restless legs a few times, legs will kick on their own when watching TV  Will get up and walk to prevent    Occurs more when relaxing, worse in the evening      [place] Sleepiness Scale:     Sitting and reading: Would never doze  Watching TV: Slight chance of dozing  Sitting, inactive in a public place (e g  a theatre or a meeting):  Would never doze  As a passenger in a car for an hour without a break: Would never doze  Lying down to rest in the afternoon when circumstances permit: Moderate chance of dozing  Sitting and talking to someone: Would never doze  Sitting quietly after a lunch without alcohol: Would never doze  In a car, while stopped for a few minutes in traffic: Would never doze  Total score: 3     The following portions of the patient's history were reviewed and updated as appropriate: allergies, current medications, past family history, past medical history, past social history, past surgical history, and problem list     Review of Systems    Genitourinary need to urinate more than twice a night, hot flashes at night and post menopausal (no peroids)   Cardiology palpitations/fluttering feeling in the chest   Gastrointestinal frequent heartburn/acid reflux   Neurology need to move extremities   Constitutional none   Integumentary itching   Psychiatry none   Musculoskeletal legs twitching/jerking   Pulmonary none   ENT ringing in ears   Endocrine none   Hematological none        Objective:        /58 (BP Location: Left arm, Patient Position: Sitting, Cuff Size: Adult)   Pulse 71    RRR  Lungs CTA bl  No edema

## 2022-07-18 NOTE — ASSESSMENT & PLAN NOTE
Ms Rajan Toney is doing great, she is consistently using her CPAP machine and her AHI is normal with treatment  Treatment is been very beneficial   She has met Medicare is compliance guidelines  We reviewed the need for continued CPAP use which she is motivated to do  She had some nasal irritation, we discussed that once this heals, in the future she can use over-the-counter nasal saline gel to mitigate this

## 2022-07-18 NOTE — PROGRESS NOTES
Review of Systems      Genitourinary need to urinate more than twice a night, hot flashes at night and post menopausal (no peroids)   Cardiology palpitations/fluttering feeling in the chest   Gastrointestinal frequent heartburn/acid reflux   Neurology need to move extremities   Constitutional none   Integumentary itching   Psychiatry none   Musculoskeletal legs twitching/jerking   Pulmonary none   ENT ringing in ears   Endocrine none   Hematological none

## 2022-07-28 ENCOUNTER — PATIENT MESSAGE (OUTPATIENT)
Dept: SLEEP CENTER | Facility: CLINIC | Age: 67
End: 2022-07-28

## 2022-07-29 ENCOUNTER — TELEPHONE (OUTPATIENT)
Dept: SLEEP CENTER | Facility: CLINIC | Age: 67
End: 2022-07-29

## 2022-07-29 DIAGNOSIS — G25.81 RESTLESS LEGS SYNDROME (RLS): ICD-10-CM

## 2022-07-29 DIAGNOSIS — I48.0 PAROXYSMAL ATRIAL FIBRILLATION (HCC): Primary | ICD-10-CM

## 2022-07-29 NOTE — TELEPHONE ENCOUNTER
----- Message from Benny Palomo sent at 7/28/2022 11:59 AM EDT -----  Regarding: Iron panel  I had the test last Friday at [address]  Are the results back yet?

## 2022-07-29 NOTE — TELEPHONE ENCOUNTER
Results reviewed- ferritin of 20 ng/ml is lower than goal in RLS  Would rec iron supplementation and recheck of iron in 3 months    Note sent to nursing to convey to pt-   Can you let her know that her ferritin level was 20, and restless legs we like this above 75  I would recommend she start an over-the-counter iron supplement- I like Vitron-C, one daily  It is possible that over time this will help lessen her restless leg symptoms, the benefit is not immediate  She should have her iron levels checked in 3 months and should know that she should not stay on iron indefinitely unless someone is monitoring her levels as there is a risk of iron overload

## 2022-08-01 NOTE — TELEPHONE ENCOUNTER
Called patient, left message that ferritin levels were low (20) and under normal circumstances should be >75  Dr Bonny Miller recommending Vitron-C  Explained in the message that effects would not be immediate and that patient should not take supplement indefinitely unless having regulaly monitoring of iron levels

## 2022-08-01 NOTE — TELEPHONE ENCOUNTER
From: Renu Singh  To: Kristin Wang MD  Sent: 7/28/2022 11:59 AM EDT  Subject: Iron panel    I had the test last Friday at [facility]  Are the results back yet?

## 2022-08-29 NOTE — PROGRESS NOTES
Consultation - Electrophysiology-Cardiology (EP)   Chacorta Sparrow 73 y o  female MRN: [id number]  Unit/Bed#:  Encounter: [id number]      1  Paroxysmal atrial fibrillation (HCC)     2  SVT (supraventricular tachycardia) ([facility] [address] )     3  PFO (patent foramen ovale)     4  Primary hypertension     5  Obstructive sleep apnea     6  Current use of long term anticoagulation     7  Mixed hyperlipidemia     8   Overweight           Consults  Physician Requesting Consult: Melecio Moreno MD , originally Dr Kari Cortez from [address] [place] [address] [address]  Reason for Consult / Principal Problem:  Management of atrial fibrillation      Assessment/Plan   Palpitation , history of SVT  Primary Hypertension   AFib  Long-term anticoagulation, chads Vasc 3 and is on Eliquis  Mixed hyperlipidemia   PFO  Overweight  Obstructive sleep apnea          Atrial fibrillation  Long-term anticoagulation  Symptoms present for -approximately 6 months  Diagnosis is based on cardia, from prior cardiologist Dr Anthony Sam  I do not have access to the strips    Patient occasionally gets palpitation   Her friend who is a doctor has reviewed her strips and said it was PACs  Usually happens in a 3-4 day period every month    Risk factors   Age-66  Obesity-BMI 25  Obstructive sleep apnea -not been tested  Thyroid disorder -check TSH  Hypertension -present, on medication  Heart failure -systolic function retained  Diabetes mellitus -check hemoglobin A1c  Tobacco use-never smoker  Alcohol use-rare social  Energy drink use-none    Current therapy   Anticoagulation-chads Vasc uwpux-89-kiyt-old female with hypertension-on Xarelto  Rate control-starting metoprolol succinate 25 mg 2 times daily  Rhythm control-none    Nuclear stress study is negative for ischemia      My recommendation for this patient  Continue Xarelto 15 mg once daily   Continue metoprolol 25 mg 2 times daily  Home sleep study     If has episodes of palpitation/AFib, considering negative stress tests, use flecainide as a pill in the pocket - 150 mg once has palpitation , repeat 150 mg in 30 minutes if still continuing-total of 300 mg in 1 day   If palpitations are becoming more regular can use flecainide as a regular medication-50 or 100 mg 2 times daily            SVT   2016, resulted in ER visit, Terminated by Valsalva maneuver  Was well controlled on diltiazem - medication was discontinued as it was too expensive  Recurrence after the same  Patient is being started on metoprolol 25 mg 2 times daily      Obstructive sleep apnea   Occasional snoring, rare morning fatigue  Close association of atrial arrhythmias with sleep apnea  Recommend home sleep study      Hypertension   Blood pressure-126/82  Medication-metoprolol succinate, lisinopril, chlorthalidone  My recommendation-metoprolol succinate is new medication, will need to review blood pressure with primary to see if control is better      Mixed hyperlipidemia  Prior records show use of atorvastatin  No myositis or myalgia   Need to confirm with patient if She is still taking the same      Patent foramen ovale  Small   Not seen on echo in 2019      Pruritus  Patient is going to follow-up with her primary  She already is on fexofenadine        Summary of my recommendation for the patient   Continue Xarelto 15 mg once daily   Continue metoprolol 25 mg 2 times daily  Home sleep study     If has episodes of palpitation/AFib, considering negative stress tests, use flecainide as a pill in the pocket - 150 mg once has palpitation , repeat 150 mg in 30 minutes if still continuing-total of 300 mg in 1 day   If palpitations are becoming more regular can use flecainide as a regular medication-50 or 100 mg 2 times daily      Needs to follow up with primary with regards to pruritis              History of Present Illness   HPI: Brian Benjamin is a 73y o  year old female has been referred to me by PCP for the evaluation of SVT and atrial fibrillation    Patient occasionally gets palpitation  Her friend who is a physician has reviewed the strips and suggest PAC  She usually gets palpitation 3-4 days in a month    Patient is complaining of new symptom pruritus   We have not started any new antiarrhythmic medication as yet    The patient has significant medical illnesses which include  Palpitation , history of SVT  Primary Hypertension   AFib  Long-term anticoagulation, chads Vasc 3 and is on Eliquis  Mixed hyperlipidemia   PFO  Overweight  Obstructive sleep apnea    Notes from prior cardiologist, Dr Kori Deutsch was reviewed  A) As far as atrial fibrillation is concerned, the patient follows with a Kardia   June 2021-AFib  January 2022-AFib  B) As far as SVT is concerned  2016, Patient was in the ED with rapid heart rate in the 240s, terminated with vagal maneuver  She was started on diltiazem with good control of symptoms  Diltiazem was too expensive and patient was switched to amlodipine in August 2020  She was having more frequent palpitations on amlodipine      As far as cardiac symptoms are concerned  Angina -occasional chest pain and pressure on exertion  Orthopnea -negative  Paroxysmal nocturnal dyspnea -negative  Leg swelling-some swelling while on amlodipine  Palpitations -present  Presyncope-rare  Syncope -negative  Orthostatic lightheadedness -occasional  Exertional intolerance-rare      Snoring-unsure  morning fatigue-occasional  Daytime sleepiness-rare      Social history  Tobacco use-never smoker  Alcohol use-rare social  Energy drink use-never used  Recreational drug use-never used      Family history  Hypertension    Historical Information   No past medical history on file  No past surgical history on file    Social History     Substance and Sexual Activity   Alcohol Use Not Currently     Social History     Substance and Sexual Activity   Drug Use Not Currently     Social History     Tobacco Use   Smoking Status Never Smoker   Smokeless Tobacco Never Used     Social History     Socioeconomic History    Marital status: /Civil Union     Spouse name: Not on file    Number of children: Not on file    Years of education: Not on file    Highest education level: Not on file   Occupational History    Not on file   Tobacco Use    Smoking status: Never Smoker    Smokeless tobacco: Never Used   Vaping Use    Vaping Use: Never used   Substance and Sexual Activity    Alcohol use: Not Currently    Drug use: Not Currently    Sexual activity: Not on file   Other Topics Concern    Not on file   Social History Narrative    Not on file     Social Determinants of Health     Financial Resource Strain: Not on file   Food Insecurity: Not on file   Transportation Needs: Not on file   Physical Activity: Not on file   Stress: Not on file   Social Connections: Not on file   Intimate Partner Violence: Not on file   Housing Stability: Not on file       Family History:  Family History   Problem Relation Age of Onset    Sleep apnea Neg Hx     Restless legs syndrome Neg Hx          Meds/Allergies      No current facility-administered medications for this visit  (Not in a hospital admission)      Allergies   Allergen Reactions    Peanut Oil - Food Allergy GI Intolerance, Rash and Anaphylaxis     flushed  flushed           Objective   Vitals:   Visit Vitals  Smoking Status Never Smoker      There were no vitals filed for this visit  [unfilled]    Invasive Devices  Report    None                   ROS  Review of Systems   All other systems reviewed and are negative  As described in my history of present illness        PHYSICAL EXAM  Physical Exam  Vitals reviewed  Constitutional:       General: She is not in acute distress  Appearance: Normal appearance  She is not ill-appearing  HENT:      Head: Normocephalic and atraumatic  Right Ear: External ear normal       Left Ear: External ear normal       Nose: Nose normal    Eyes:      General: No scleral icterus       Extraocular Movements: Extraocular movements intact  Conjunctiva/sclera: Conjunctivae normal       Pupils: Pupils are equal, round, and reactive to light  Cardiovascular:      Rate and Rhythm: Normal rate and regular rhythm  Pulses: Normal pulses  Heart sounds: Normal heart sounds  No murmur heard  Pulmonary:      Effort: Pulmonary effort is normal  No respiratory distress  Breath sounds: Normal breath sounds  No wheezing  Abdominal:      General: Bowel sounds are normal  There is no distension  Tenderness: There is no abdominal tenderness  Musculoskeletal:         General: No swelling, tenderness or deformity  Cervical back: No rigidity  Skin:     Coloration: Skin is not jaundiced  Findings: No bruising  Neurological:      Mental Status: She is alert  Mental status is at baseline  Motor: No weakness  Psychiatric:         Mood and Affect: Mood normal          Thought Content: Thought content normal                LAB RESULTS:              Imaging:    STRESS  2-9-22    Isotope Administration The isotope used for nuclear imaging was technetium tetrofosmin  The isotope was administered intravenously via the left antecubital fossa  Imaging was performed at rest 58 minutes after an injection on 2/9/2022 at 08:02 EST of 10 80 mCi  Imaging was performed at peak stress 35 minutes after an injection on 2/9/2022 at 10:16 EST of 31 70 mCi  Nuclear Study Quality Images were viewed in the short axis, vertical long axis and horizontal long axis  Stress Findings A pharmacological stress test was performed using regadenoson  The patient and had a maximal HR of 122 bpm ( % of MPHR) and  METS  The patient experienced no angina during the test  The patient reached the end of the protocol  Low level exercise was used during the pharmacological stress test  The patient reported dyspnea during the stress test  Symptoms began during stress and ended during recovery  Blood pressure demonstrated a normal response to stress  Perfusion There are no perfusion defects  Stress Function Left ventricular function post-stress is normal  Post-stress ejection fraction is 81 %  General Findings The stress/rest perfusion ratio is 0 98   There is no evidence of transient ischemic dilation (TID)  The stress end diastolic cavity size is normal                  Echocardiogram- 2D  1-    Narrative      Left Ventricle: Systolic function is normal with an ejection fraction   of 60-65%    Tricuspid Valve: There is mild regurgitation    Pulmonic Valve: The estimated pulmonary artery systolic pressure is   01 9 mmHg  Left Ventricle   Left ventricle is normal in size  Wall thickness is normal  Systolic function is normal with an ejection fraction of 60-65%  Wall motion is within normal limits  There is normal diastolic function  Right Ventricle   Right ventricle cavity is normal  Systolic function is normal      Left Atrium   Left atrium cavity size is normal      Right Atrium   Right atrium cavity is normal      IVC/SVC   The inferior vena cava demonstrates a diameter of <=21 mm and collapses >50%; therefore, the right atrial pressure is estimated at 0-5 mmHg  Mitral Valve   Mitral valve structure is normal  There is no regurgitation or stenosis  Tricuspid Valve   Tricuspid valve structure is normal  There is mild regurgitation  There is no evidence of tricuspid valve stenosis  Aortic Valve   The aortic valve is trileaflet  There is no regurgitation or stenosis  Pulmonic Valve   Pulmonic valve structure is normal  There is no regurgitation or stenosis  The estimated pulmonary artery systolic pressure is 71 9 mmHg  Ascending Aorta   The aorta appears normal in size  Pericardium   There is no pericardial effusion  Stress Test:   No results found for this or any previous visit  Cardiac catheterization :  No results found for this or any previous visit            DEVICE CHECK:       No results found for this or any previous visit          Code Status: [unfilled]  Advance Directive and Living Will:      Power of :    POLST:      Counseling / Coordination of Care  Detailed discussion done about initiation of therapy for SVT, atrial fibrillation  Can use flecainide as pill in the pocket      Brad Gordon MD

## 2022-08-30 ENCOUNTER — OFFICE VISIT (OUTPATIENT)
Dept: CARDIOLOGY CLINIC | Facility: CLINIC | Age: 67
End: 2022-08-30
Payer: MEDICARE

## 2022-08-30 VITALS
BODY MASS INDEX: 27.53 KG/M2 | HEART RATE: 54 BPM | DIASTOLIC BLOOD PRESSURE: 82 MMHG | HEIGHT: 62 IN | WEIGHT: 149.6 LBS | SYSTOLIC BLOOD PRESSURE: 126 MMHG

## 2022-08-30 DIAGNOSIS — E66.3 OVERWEIGHT: ICD-10-CM

## 2022-08-30 DIAGNOSIS — I47.1 SVT (SUPRAVENTRICULAR TACHYCARDIA) (HCC): ICD-10-CM

## 2022-08-30 DIAGNOSIS — Z79.01 CURRENT USE OF LONG TERM ANTICOAGULATION: ICD-10-CM

## 2022-08-30 DIAGNOSIS — I48.0 PAROXYSMAL ATRIAL FIBRILLATION (HCC): ICD-10-CM

## 2022-08-30 DIAGNOSIS — Q21.12 PFO (PATENT FORAMEN OVALE): ICD-10-CM

## 2022-08-30 DIAGNOSIS — I10 PRIMARY HYPERTENSION: ICD-10-CM

## 2022-08-30 DIAGNOSIS — E78.2 MIXED HYPERLIPIDEMIA: ICD-10-CM

## 2022-08-30 DIAGNOSIS — G47.33 OBSTRUCTIVE SLEEP APNEA: ICD-10-CM

## 2022-08-30 PROCEDURE — 93000 ELECTROCARDIOGRAM COMPLETE: CPT | Performed by: INTERNAL MEDICINE

## 2022-08-30 PROCEDURE — 99214 OFFICE O/P EST MOD 30 MIN: CPT | Performed by: INTERNAL MEDICINE

## 2022-08-30 RX ORDER — FEXOFENADINE HCL 180 MG/1
180 TABLET ORAL 2 TIMES DAILY
COMMUNITY
Start: 2022-08-01

## 2022-08-30 NOTE — LETTER
August 30, 2022     [facility]    Patient: Nga Schaffer   YOB: 1955   Date of Visit: 8/30/2022       Dear Dr Granados : Thank you for referring Roz Contreras to me for evaluation  Below are my notes for this consultation  If you have questions, please do not hesitate to call me  I look forward to following your patient along with you  Sincerely,        Max Biggs MD        CC: No Recipients  Max Biggs MD  8/30/2022 11:49 AM  Sign when Signing Visit   Consultation - Electrophysiology-Cardiology (EP)   Nga Schaffer 73 y o  female MRN: [id number]  Unit/Bed#:  Encounter: [id number]      1  Paroxysmal atrial fibrillation (HCC)     2  SVT (supraventricular tachycardia) ([address] )     3  PFO (patent foramen ovale)     4  Primary hypertension     5  Obstructive sleep apnea     6  Current use of long term anticoagulation     7  Mixed hyperlipidemia     8   Overweight           Consults  Physician Requesting Consult: Litzy Ruiz MD , originally Dr Lorin Crocker from [address] [place] [address] [address]  Reason for Consult / Principal Problem:  Management of atrial fibrillation      Assessment/Plan   Palpitation , history of SVT  Primary Hypertension   AFib  Long-term anticoagulation, chads Vasc 3 and is on Eliquis  Mixed hyperlipidemia   PFO  Overweight  Obstructive sleep apnea          Atrial fibrillation  Long-term anticoagulation  Symptoms present for -approximately 6 months  Diagnosis is based on cardia, from prior cardiologist Dr Melissa Fernandez  I do not have access to the strips    Patient occasionally gets palpitation   Her friend who is a doctor has reviewed her strips and said it was PACs  Usually happens in a 3-4 day period every month    Risk factors   Age-66  Obesity-BMI 25  Obstructive sleep apnea -not been tested  Thyroid disorder -check TSH  Hypertension -present, on medication  Heart failure -systolic function retained  Diabetes mellitus -check hemoglobin A1c  Tobacco use-never smoker  Alcohol use-rare social  Energy drink use-none    Current therapy   Anticoagulation-chads Vasc aoucf-[id number]-old female with hypertension-on Xarelto  Rate control-starting metoprolol succinate 25 mg 2 times daily  Rhythm control-none    Nuclear stress study is negative for ischemia      My recommendation for this patient  Continue Xarelto 15 mg once daily   Continue metoprolol 25 mg 2 times daily  Home sleep study     If has episodes of palpitation/AFib, considering negative stress tests, use flecainide as a pill in the pocket - 150 mg once has palpitation , repeat 150 mg in 30 minutes if still continuing-total of 300 mg in 1 day   If palpitations are becoming more regular can use flecainide as a regular medication-50 or 100 mg 2 times daily            SVT   2016, resulted in ER visit, Terminated by Valsalva maneuver  Was well controlled on diltiazem - medication was discontinued as it was too expensive  Recurrence after the same  Patient is being started on metoprolol 25 mg 2 times daily      Obstructive sleep apnea   Occasional snoring, rare morning fatigue  Close association of atrial arrhythmias with sleep apnea  Recommend home sleep study      Hypertension   Blood pressure-126/82  Medication-metoprolol succinate, lisinopril, chlorthalidone  My recommendation-metoprolol succinate is new medication, will need to review blood pressure with primary to see if control is better      Mixed hyperlipidemia  Prior records show use of atorvastatin  No myositis or myalgia   Need to confirm with patient if She is still taking the same      Patent foramen ovale  Small   Not seen on echo in 2019      Pruritus  Patient is going to follow-up with her primary  She already is on fexofenadine        Summary of my recommendation for the patient   Continue Xarelto 15 mg once daily   Continue metoprolol 25 mg 2 times daily  Home sleep study     If has episodes of palpitation/AFib, considering negative stress tests, use flecainide as a pill in the pocket - 150 mg once has palpitation , repeat 150 mg in 30 minutes if still continuing-total of 300 mg in 1 day   If palpitations are becoming more regular can use flecainide as a regular medication-50 or 100 mg 2 times daily      Needs to follow up with primary with regards to pruritis              History of Present Illness   HPI: Aleja Lobato is a 73y o  year old female has been referred to me by PCP for the evaluation of SVT and atrial fibrillation    Patient occasionally gets palpitation  Her friend who is a physician has reviewed the strips and suggest PAC  She usually gets palpitation 3-4 days in a month    Patient is complaining of new symptom pruritus   We have not started any new antiarrhythmic medication as yet    The patient has significant medical illnesses which include  Palpitation , history of SVT  Primary Hypertension   AFib  Long-term anticoagulation, chads Vasc 3 and is on Eliquis  Mixed hyperlipidemia   PFO  Overweight  Obstructive sleep apnea    Notes from prior cardiologist, Dr Suyapa Ohara was reviewed  A) As far as atrial fibrillation is concerned, the patient follows with a Kardia   June 2021-AFib  January 2022-AFib  B) As far as SVT is concerned  2016, Patient was in the ED with rapid heart rate in the 240s, terminated with vagal maneuver  She was started on diltiazem with good control of symptoms  Diltiazem was too expensive and patient was switched to amlodipine in August 2020  She was having more frequent palpitations on amlodipine      As far as cardiac symptoms are concerned  Angina -occasional chest pain and pressure on exertion  Orthopnea -negative  Paroxysmal nocturnal dyspnea -negative  Leg swelling-some swelling while on amlodipine  Palpitations -present  Presyncope-rare  Syncope -negative  Orthostatic lightheadedness -occasional  Exertional intolerance-rare      Snoring-unsure  morning fatigue-occasional  Daytime sleepiness-rare      Social history  Tobacco use-never smoker  Alcohol use-rare social  Energy drink use-never used  Recreational drug use-never used      Family history  Hypertension    Historical Information   No past medical history on file  No past surgical history on file  Social History     Substance and Sexual Activity   Alcohol Use Not Currently     Social History     Substance and Sexual Activity   Drug Use Not Currently     Social History     Tobacco Use   Smoking Status Never Smoker   Smokeless Tobacco Never Used     Social History     Socioeconomic History    Marital status: /Civil Union     Spouse name: Not on file    Number of children: Not on file    Years of education: Not on file    Highest education level: Not on file   Occupational History    Not on file   Tobacco Use    Smoking status: Never Smoker    Smokeless tobacco: Never Used   Vaping Use    Vaping Use: Never used   Substance and Sexual Activity    Alcohol use: Not Currently    Drug use: Not Currently    Sexual activity: Not on file   Other Topics Concern    Not on file   Social History Narrative    Not on file     Social Determinants of Health     Financial Resource Strain: Not on file   Food Insecurity: Not on file   Transportation Needs: Not on file   Physical Activity: Not on file   Stress: Not on file   Social Connections: Not on file   Intimate Partner Violence: Not on file   Housing Stability: Not on file       Family History:  Family History   Problem Relation Age of Onset    Sleep apnea Neg Hx     Restless legs syndrome Neg Hx          Meds/Allergies      No current facility-administered medications for this visit  (Not in a hospital admission)      Allergies   Allergen Reactions    Peanut Oil - Food Allergy GI Intolerance, Rash and Anaphylaxis     flushed  flushed           Objective   Vitals:   Visit Vitals  Smoking Status Never Smoker      There were no vitals filed for this visit  [unfilled]    Invasive Devices  Report    None                   ROS  Review of Systems   All other systems reviewed and are negative  As described in my history of present illness        PHYSICAL EXAM  Physical Exam  Vitals reviewed  Constitutional:       General: She is not in acute distress  Appearance: Normal appearance  She is not ill-appearing  HENT:      Head: Normocephalic and atraumatic  Right Ear: External ear normal       Left Ear: External ear normal       Nose: Nose normal    Eyes:      General: No scleral icterus  Extraocular Movements: Extraocular movements intact  Conjunctiva/sclera: Conjunctivae normal       Pupils: Pupils are equal, round, and reactive to light  Cardiovascular:      Rate and Rhythm: Normal rate and regular rhythm  Pulses: Normal pulses  Heart sounds: Normal heart sounds  No murmur heard  Pulmonary:      Effort: Pulmonary effort is normal  No respiratory distress  Breath sounds: Normal breath sounds  No wheezing  Abdominal:      General: Bowel sounds are normal  There is no distension  Tenderness: There is no abdominal tenderness  Musculoskeletal:         General: No swelling, tenderness or deformity  Cervical back: No rigidity  Skin:     Coloration: Skin is not jaundiced  Findings: No bruising  Neurological:      Mental Status: She is alert  Mental status is at baseline  Motor: No weakness  Psychiatric:         Mood and Affect: Mood normal          Thought Content: Thought content normal                LAB RESULTS:              Imaging:    STRESS  2-9-22    Isotope Administration The isotope used for nuclear imaging was technetium tetrofosmin  The isotope was administered intravenously via the left antecubital fossa  Imaging was performed at rest 58 minutes after an injection on 2/9/2022 at 08:02 EST of 10 80 mCi  Imaging was performed at peak stress 35 minutes after an injection on 2/9/2022 at 10:16 EST of 31 70 mCi     Nuclear Study Quality Images were viewed in the short axis, vertical long axis and horizontal long axis  Stress Findings A pharmacological stress test was performed using regadenoson  The patient and had a maximal HR of 122 bpm ( % of MPHR) and  METS  The patient experienced no angina during the test  The patient reached the end of the protocol  Low level exercise was used during the pharmacological stress test  The patient reported dyspnea during the stress test  Symptoms began during stress and ended during recovery  Blood pressure demonstrated a normal response to stress  Perfusion There are no perfusion defects  Stress Function Left ventricular function post-stress is normal  Post-stress ejection fraction is 81 %  General Findings The stress/rest perfusion ratio is 0 98   There is no evidence of transient ischemic dilation (TID)  The stress end diastolic cavity size is normal                  Echocardiogram- 2D  1-    Narrative      Left Ventricle: Systolic function is normal with an ejection fraction   of 60-65%    Tricuspid Valve: There is mild regurgitation    Pulmonic Valve: The estimated pulmonary artery systolic pressure is   34 6 mmHg  Left Ventricle   Left ventricle is normal in size  Wall thickness is normal  Systolic function is normal with an ejection fraction of 60-65%  Wall motion is within normal limits  There is normal diastolic function  Right Ventricle   Right ventricle cavity is normal  Systolic function is normal      Left Atrium   Left atrium cavity size is normal      Right Atrium   Right atrium cavity is normal      IVC/SVC   The inferior vena cava demonstrates a diameter of <=21 mm and collapses >50%; therefore, the right atrial pressure is estimated at 0-5 mmHg  Mitral Valve   Mitral valve structure is normal  There is no regurgitation or stenosis  Tricuspid Valve   Tricuspid valve structure is normal  There is mild regurgitation  There is no evidence of tricuspid valve stenosis  Aortic Valve   The aortic valve is trileaflet  There is no regurgitation or stenosis  Pulmonic Valve   Pulmonic valve structure is normal  There is no regurgitation or stenosis  The estimated pulmonary artery systolic pressure is 10 0 mmHg  Ascending Aorta   The aorta appears normal in size  Pericardium   There is no pericardial effusion  Stress Test:   No results found for this or any previous visit  Cardiac catheterization :  No results found for this or any previous visit  DEVICE CHECK:       No results found for this or any previous visit          Code Status: [unfilled]  Advance Directive and Living Will:      Power of :    POLST:      Counseling / Coordination of Care  Detailed discussion done about initiation of therapy for SVT, atrial fibrillation  Can use flecainide as pill in the pocket      Og Faulkner MD

## 2022-08-30 NOTE — LETTER
August 30, 2022     Jojo Chermarisa, [address]  [address] [place] [address] Drive  [address] [place] [address]    Patient: Hieu Saba   YOB: 1955   Date of Visit: 8/30/2022       Dear Dr Avery Cantor: Thank you for referring Pia Cabrera to me for evaluation  Below are my notes for this consultation  If you have questions, please do not hesitate to call me  I look forward to following your patient along with you  Sincerely,        Jose Sandoval MD        CC: Cindy Berg MD  8/30/2022 11:49 AM  Sign when Signing Visit   Consultation - Electrophysiology-Cardiology (EP)   Hieu Saba 73 y o  female MRN: [id number]  Unit/Bed#:  Encounter: [id number]      1  Paroxysmal atrial fibrillation (HCC)     2  SVT (supraventricular tachycardia) ([address] )     3  PFO (patent foramen ovale)     4  Primary hypertension     5  Obstructive sleep apnea     6  Current use of long term anticoagulation     7  Mixed hyperlipidemia     8   Overweight           Consults  Physician Requesting Consult: Alina Monroe MD , originally Dr Megan Hyatt from [facility]  Reason for Consult / Principal Problem:  Management of atrial fibrillation      Assessment/Plan   Palpitation , history of SVT  Primary Hypertension   AFib  Long-term anticoagulation, chads Vasc 3 and is on Eliquis  Mixed hyperlipidemia   PFO  Overweight  Obstructive sleep apnea          Atrial fibrillation  Long-term anticoagulation  Symptoms present for -approximately 6 months  Diagnosis is based on cardia, from prior cardiologist Dr Breann Rosario  I do not have access to the strips    Patient occasionally gets palpitation   Her friend who is a doctor has reviewed her strips and said it was PACs  Usually happens in a 3-4 day period every month    Risk factors   Age-66  Obesity-BMI 25  Obstructive sleep apnea -not been tested  Thyroid disorder -check TSH  Hypertension -present, on medication  Heart failure -systolic function retained  Diabetes mellitus -check hemoglobin A1c  Tobacco use-never smoker  Alcohol use-rare social  Energy drink use-none    Current therapy   Anticoagulation-chads Vasc [id number]-old female with hypertension-on Xarelto  Rate control-starting metoprolol succinate 25 mg 2 times daily  Rhythm control-none    Nuclear stress study is negative for ischemia      My recommendation for this patient  Continue Xarelto 15 mg once daily   Continue metoprolol 25 mg 2 times daily  Home sleep study     If has episodes of palpitation/AFib, considering negative stress tests, use flecainide as a pill in the pocket - 150 mg once has palpitation , repeat 150 mg in 30 minutes if still continuing-total of 300 mg in 1 day   If palpitations are becoming more regular can use flecainide as a regular medication-50 or 100 mg 2 times daily            SVT   2016, resulted in ER visit, Terminated by Valsalva maneuver  Was well controlled on diltiazem - medication was discontinued as it was too expensive  Recurrence after the same  Patient is being started on metoprolol 25 mg 2 times daily      Obstructive sleep apnea   Occasional snoring, rare morning fatigue  Close association of atrial arrhythmias with sleep apnea  Recommend home sleep study      Hypertension   Blood pressure-126/82  Medication-metoprolol succinate, lisinopril, chlorthalidone  My recommendation-metoprolol succinate is new medication, will need to review blood pressure with primary to see if control is better      Mixed hyperlipidemia  Prior records show use of atorvastatin  No myositis or myalgia   Need to confirm with patient if She is still taking the same      Patent foramen ovale  Small   Not seen on echo in 2019      Pruritus  Patient is going to follow-up with her primary  She already is on fexofenadine        Summary of my recommendation for the patient   Continue Xarelto 15 mg once daily   Continue metoprolol 25 mg 2 times daily  Home sleep study     If has episodes of palpitation/AFib, considering negative stress tests, use flecainide as a pill in the pocket - 150 mg once has palpitation , repeat 150 mg in 30 minutes if still continuing-total of 300 mg in 1 day   If palpitations are becoming more regular can use flecainide as a regular medication-50 or 100 mg 2 times daily      Needs to follow up with primary with regards to pruritis              History of Present Illness   HPI: Genna Garcia is a 73y o  year old female has been referred to me by PCP for the evaluation of SVT and atrial fibrillation    Patient occasionally gets palpitation  Her friend who is a physician has reviewed the strips and suggest PAC  She usually gets palpitation 3-4 days in a month    Patient is complaining of new symptom pruritus   We have not started any new antiarrhythmic medication as yet    The patient has significant medical illnesses which include  Palpitation , history of SVT  Primary Hypertension   AFib  Long-term anticoagulation, chads Vasc 3 and is on Eliquis  Mixed hyperlipidemia   PFO  Overweight  Obstructive sleep apnea    Notes from prior cardiologist, Dr Anya Mcgregor was reviewed  A) As far as atrial fibrillation is concerned, the patient follows with a Kardia   June 2021-AFib  January 2022-AFib  B) As far as SVT is concerned  2016, Patient was in the ED with rapid heart rate in the 240s, terminated with vagal maneuver  She was started on diltiazem with good control of symptoms  Diltiazem was too expensive and patient was switched to amlodipine in August 2020  She was having more frequent palpitations on amlodipine      As far as cardiac symptoms are concerned  Angina -occasional chest pain and pressure on exertion  Orthopnea -negative  Paroxysmal nocturnal dyspnea -negative  Leg swelling-some swelling while on amlodipine  Palpitations -present  Presyncope-rare  Syncope -negative  Orthostatic lightheadedness -occasional  Exertional intolerance-rare      Snoring-unsure  morning fatigue-occasional  Daytime sleepiness-rare      Social history  Tobacco use-never smoker  Alcohol use-rare social  Energy drink use-never used  Recreational drug use-never used      Family history  Hypertension    Historical Information   No past medical history on file  No past surgical history on file  Social History     Substance and Sexual Activity   Alcohol Use Not Currently     Social History     Substance and Sexual Activity   Drug Use Not Currently     Social History     Tobacco Use   Smoking Status Never Smoker   Smokeless Tobacco Never Used     Social History     Socioeconomic History    Marital status: /Civil Union     Spouse name: Not on file    Number of children: Not on file    Years of education: Not on file    Highest education level: Not on file   Occupational History    Not on file   Tobacco Use    Smoking status: Never Smoker    Smokeless tobacco: Never Used   Vaping Use    Vaping Use: Never used   Substance and Sexual Activity    Alcohol use: Not Currently    Drug use: Not Currently    Sexual activity: Not on file   Other Topics Concern    Not on file   Social History Narrative    Not on file     Social Determinants of Health     Financial Resource Strain: Not on file   Food Insecurity: Not on file   Transportation Needs: Not on file   Physical Activity: Not on file   Stress: Not on file   Social Connections: Not on file   Intimate Partner Violence: Not on file   Housing Stability: Not on file       Family History:  Family History   Problem Relation Age of Onset    Sleep apnea Neg Hx     Restless legs syndrome Neg Hx          Meds/Allergies      No current facility-administered medications for this visit  (Not in a hospital admission)      Allergies   Allergen Reactions    Peanut Oil - Food Allergy GI Intolerance, Rash and Anaphylaxis     flushed  flushed           Objective   Vitals:   Visit Vitals  Smoking Status Never Smoker      There were no vitals filed for this visit  [unfilled]    Invasive Devices  Report None                   ROS  Review of Systems   All other systems reviewed and are negative  As described in my history of present illness        PHYSICAL EXAM  Physical Exam  Vitals reviewed  Constitutional:       General: She is not in acute distress  Appearance: Normal appearance  She is not ill-appearing  HENT:      Head: Normocephalic and atraumatic  Right Ear: External ear normal       Left Ear: External ear normal       Nose: Nose normal    Eyes:      General: No scleral icterus  Extraocular Movements: Extraocular movements intact  Conjunctiva/sclera: Conjunctivae normal       Pupils: Pupils are equal, round, and reactive to light  Cardiovascular:      Rate and Rhythm: Normal rate and regular rhythm  Pulses: Normal pulses  Heart sounds: Normal heart sounds  No murmur heard  Pulmonary:      Effort: Pulmonary effort is normal  No respiratory distress  Breath sounds: Normal breath sounds  No wheezing  Abdominal:      General: Bowel sounds are normal  There is no distension  Tenderness: There is no abdominal tenderness  Musculoskeletal:         General: No swelling, tenderness or deformity  Cervical back: No rigidity  Skin:     Coloration: Skin is not jaundiced  Findings: No bruising  Neurological:      Mental Status: She is alert  Mental status is at baseline  Motor: No weakness  Psychiatric:         Mood and Affect: Mood normal          Thought Content: Thought content normal                LAB RESULTS:              Imaging:    STRESS  2-9-22    Isotope Administration The isotope used for nuclear imaging was technetium tetrofosmin  The isotope was administered intravenously via the left antecubital fossa  Imaging was performed at rest 58 minutes after an injection on 2/9/2022 at 08:02 EST of 10 80 mCi  Imaging was performed at peak stress 35 minutes after an injection on 2/9/2022 at 10:16 EST of 31 70 mCi     Nuclear Study Quality Images were viewed in the short axis, vertical long axis and horizontal long axis  Stress Findings A pharmacological stress test was performed using regadenoson  The patient and had a maximal HR of 122 bpm ( % of MPHR) and  METS  The patient experienced no angina during the test  The patient reached the end of the protocol  Low level exercise was used during the pharmacological stress test  The patient reported dyspnea during the stress test  Symptoms began during stress and ended during recovery  Blood pressure demonstrated a normal response to stress  Perfusion There are no perfusion defects  Stress Function Left ventricular function post-stress is normal  Post-stress ejection fraction is 81 %  General Findings The stress/rest perfusion ratio is 0 98   There is no evidence of transient ischemic dilation (TID)  The stress end diastolic cavity size is normal                  Echocardiogram- 2D  1-    Narrative      Left Ventricle: Systolic function is normal with an ejection fraction   of 60-65%    Tricuspid Valve: There is mild regurgitation    Pulmonic Valve: The estimated pulmonary artery systolic pressure is   45 1 mmHg  Left Ventricle   Left ventricle is normal in size  Wall thickness is normal  Systolic function is normal with an ejection fraction of 60-65%  Wall motion is within normal limits  There is normal diastolic function  Right Ventricle   Right ventricle cavity is normal  Systolic function is normal      Left Atrium   Left atrium cavity size is normal      Right Atrium   Right atrium cavity is normal      IVC/SVC   The inferior vena cava demonstrates a diameter of <=21 mm and collapses >50%; therefore, the right atrial pressure is estimated at 0-5 mmHg  Mitral Valve   Mitral valve structure is normal  There is no regurgitation or stenosis  Tricuspid Valve   Tricuspid valve structure is normal  There is mild regurgitation  There is no evidence of tricuspid valve stenosis  Aortic Valve   The aortic valve is trileaflet  There is no regurgitation or stenosis  Pulmonic Valve   Pulmonic valve structure is normal  There is no regurgitation or stenosis  The estimated pulmonary artery systolic pressure is 00 1 mmHg  Ascending Aorta   The aorta appears normal in size  Pericardium   There is no pericardial effusion  Stress Test:   No results found for this or any previous visit  Cardiac catheterization :  No results found for this or any previous visit  DEVICE CHECK:       No results found for this or any previous visit          Code Status: [unfilled]  Advance Directive and Living Will:      Power of :    POLST:      Counseling / Coordination of Care  Detailed discussion done about initiation of therapy for SVT, atrial fibrillation  Can use flecainide as pill in the pocket      Rolfe Angelucci, MD

## 2022-08-31 DIAGNOSIS — I48.0 PAROXYSMAL ATRIAL FIBRILLATION (HCC): Primary | ICD-10-CM

## 2022-08-31 RX ORDER — FLECAINIDE ACETATE 150 MG/1
150 TABLET ORAL AS NEEDED
Qty: 30 TABLET | Refills: 2 | Status: SHIPPED | OUTPATIENT
Start: 2022-08-31

## 2022-08-31 NOTE — TELEPHONE ENCOUNTER
Pt called, the script for flecainide was not at the pharmacy  I can send in the script  What mg would you like for Flecainide as a pill in the pocket?

## 2022-09-21 ENCOUNTER — TELEPHONE (OUTPATIENT)
Dept: SLEEP CENTER | Facility: CLINIC | Age: 67
End: 2022-09-21

## 2022-09-21 DIAGNOSIS — G47.33 OSA (OBSTRUCTIVE SLEEP APNEA): Primary | ICD-10-CM

## 2022-09-21 NOTE — TELEPHONE ENCOUNTER
Message sent via [employer] to the patient that Rx for "Halo" chin strap was written by Dr Soni Peoples  Rx for chin strap sent to [facility] via [email or web address]

## 2022-09-21 NOTE — TELEPHONE ENCOUNTER
Patient sent [employer] message requesting Rx for "halo chin strap" as recommended by an [facility]  Last office visit 7/18/2022 with Dr Kiko Gu  Next office visit 1/19/2023 with Dr Kiko Gu, would you be willing to write Rx for this type of chin strap? Thank you

## 2022-09-21 NOTE — TELEPHONE ENCOUNTER
----- Message from Mita Palomo sent at 9/21/2022  8:13 AM EDT -----  Regarding: Chin strap  Hi, I ordered a halo ([facility] suggested this type) chin strap because I am waking with dry mouth  They say they need a new order from you    Thanks

## 2022-10-18 DIAGNOSIS — I47.1 SVT (SUPRAVENTRICULAR TACHYCARDIA) (HCC): ICD-10-CM

## 2022-10-20 ENCOUNTER — APPOINTMENT (OUTPATIENT)
Dept: LAB | Facility: CLINIC | Age: 67
End: 2022-10-20
Payer: MEDICARE

## 2022-10-20 DIAGNOSIS — I48.0 PAROXYSMAL ATRIAL FIBRILLATION (HCC): ICD-10-CM

## 2022-10-20 DIAGNOSIS — G25.81 RESTLESS LEGS SYNDROME (RLS): ICD-10-CM

## 2022-10-20 LAB
FERRITIN SERPL-MCNC: 31 NG/ML (ref 8–388)
IRON SATN MFR SERPL: 33 % (ref 15–50)
IRON SERPL-MCNC: 107 UG/DL (ref 50–170)
TIBC SERPL-MCNC: 329 UG/DL (ref 250–450)

## 2022-10-20 PROCEDURE — 36415 COLL VENOUS BLD VENIPUNCTURE: CPT

## 2022-10-20 PROCEDURE — 83550 IRON BINDING TEST: CPT

## 2022-10-20 PROCEDURE — 83540 ASSAY OF IRON: CPT

## 2022-10-20 PROCEDURE — 82728 ASSAY OF FERRITIN: CPT

## 2022-10-20 RX ORDER — RIVAROXABAN 15 MG/1
TABLET, FILM COATED ORAL
Qty: 90 TABLET | Refills: 3 | Status: SHIPPED | OUTPATIENT
Start: 2022-10-20

## 2022-10-20 RX ORDER — METOPROLOL SUCCINATE 50 MG/1
TABLET, EXTENDED RELEASE ORAL
Qty: 180 TABLET | Refills: 3 | Status: SHIPPED | OUTPATIENT
Start: 2022-10-20

## 2022-11-07 ENCOUNTER — TELEPHONE (OUTPATIENT)
Dept: SLEEP CENTER | Facility: CLINIC | Age: 67
End: 2022-11-07

## 2022-11-07 DIAGNOSIS — E61.1 IRON DEFICIENCY: ICD-10-CM

## 2022-11-07 DIAGNOSIS — G25.81 RESTLESS LEGS SYNDROME (RLS): Primary | ICD-10-CM

## 2022-11-07 NOTE — TELEPHONE ENCOUNTER
----- Message from Sosa Palomo sent at 11/2/2022  2:01 PM EDT -----  Regarding: Rx for chin strap  I had the blood work 10/20     Waiting to hear from you

## 2023-01-06 ENCOUNTER — PATIENT MESSAGE (OUTPATIENT)
Dept: SLEEP CENTER | Facility: CLINIC | Age: 68
End: 2023-01-06

## 2023-01-06 DIAGNOSIS — I10 PRIMARY HYPERTENSION: Primary | ICD-10-CM

## 2023-01-06 RX ORDER — CHLORTHALIDONE 25 MG/1
12.5 TABLET ORAL DAILY
Qty: 45 TABLET | Refills: 3 | Status: SHIPPED | OUTPATIENT
Start: 2023-01-06 | End: 2024-01-06

## 2023-01-09 ENCOUNTER — TELEPHONE (OUTPATIENT)
Dept: SLEEP CENTER | Facility: CLINIC | Age: 68
End: 2023-01-09

## 2023-01-09 DIAGNOSIS — G47.33 OSA (OBSTRUCTIVE SLEEP APNEA): Primary | ICD-10-CM

## 2023-01-09 NOTE — TELEPHONE ENCOUNTER
----- Message from Enrike Palomo sent at 1/6/2023  9:23 AM EST -----  Regarding: CPAP supplies  Contact: [phone]  Hi, can you please send rx to [facility] for my supplies? I no [facility]

## 2023-01-19 LAB

## 2023-01-19 NOTE — TELEPHONE ENCOUNTER
Rx for PAP resupply and clinical information sent to [facility] via [employer] message sent to patient advising of above

## 2023-03-13 ENCOUNTER — TELEPHONE (OUTPATIENT)
Dept: SLEEP CENTER | Facility: CLINIC | Age: 68
End: 2023-03-13

## 2023-03-13 NOTE — TELEPHONE ENCOUNTER
----- Message from Aj Palomo sent at 3/13/2023 11:12 AM EDT -----  Regarding: Iron panel  Contact: [phone]  Hi, I had to change my appointment from tomorrow to June (your next available appointment)  I have a URI  Could you give me the results of the iron panel I had recently so I know if I should continue the iron supplement?

## 2023-03-16 NOTE — TELEPHONE ENCOUNTER
Spoke to the patient she reports that she had blood work done at [address] [place] [address] [address]  I asked her if she could she her results in her [facility] chart she said no     She will check with [facility] to make sure they were drawn as she hard other blood work at the time

## 2023-05-05 ENCOUNTER — TELEPHONE (OUTPATIENT)
Dept: SLEEP CENTER | Facility: CLINIC | Age: 68
End: 2023-05-05

## 2023-05-05 NOTE — TELEPHONE ENCOUNTER
----- Message from Trena Palomo sent at 5/4/2023  3:37 PM EDT -----  Regarding: RLS  Contact: [phone]  Hi, I was wondering if medical marijuana would help RLS  I would say its about 50% improved but I still have episodes

## 2023-05-11 ENCOUNTER — TELEPHONE (OUTPATIENT)
Dept: CARDIOLOGY CLINIC | Facility: CLINIC | Age: 68
End: 2023-05-11

## 2023-05-11 NOTE — TELEPHONE ENCOUNTER
I called the patient regarding the Xarelto samples  I do have information on [employer] for the Xarelto, and I will supply her with some 15 mg samples  The savings cards or only available to patient to have commercial insurance  I did leave a message on the patient's telephone at [phone] to call me back  ----- Message from Sujata Smiley MD sent at 5/10/2023 12:02 PM EDT -----  Regarding: FW: Xarelto urgent  Contact: [phone]  Please call patient now and get her some meds for 1 month  ----- Message -----  From: Jackson Gomez MA  Sent: 5/10/2023  11:29 AM EDT  To: Sujata Smiley MD  Subject: FW: Xarelto urgent                                 ----- Message -----  From: Micha Walker  Sent: 5/10/2023   6:46 AM EDT  To: [facility] [place] [facility]  Subject: Xarelto urgent  I only have 1 xarelto left  They were on automatic refill and something happened (maybe large price increase) and it was removed from automatic refill  Do you have samples or can you call a rx for 10 days into [facility]  The price is very high for a small amount at local pharmacy even with good rx  I saw there was an offer from  for $10 monthly copay  Are you familiar with that?

## 2023-06-20 ENCOUNTER — PATIENT MESSAGE (OUTPATIENT)
Dept: SLEEP CENTER | Facility: CLINIC | Age: 68
End: 2023-06-20

## 2023-06-29 ENCOUNTER — OFFICE VISIT (OUTPATIENT)
Dept: SLEEP CENTER | Facility: CLINIC | Age: 68
End: 2023-06-29
Payer: MEDICARE

## 2023-06-29 VITALS
HEART RATE: 66 BPM | BODY MASS INDEX: 27.05 KG/M2 | WEIGHT: 147 LBS | SYSTOLIC BLOOD PRESSURE: 117 MMHG | DIASTOLIC BLOOD PRESSURE: 56 MMHG | HEIGHT: 62 IN

## 2023-06-29 DIAGNOSIS — E61.1 IRON DEFICIENCY: ICD-10-CM

## 2023-06-29 DIAGNOSIS — G25.81 RLS (RESTLESS LEGS SYNDROME): ICD-10-CM

## 2023-06-29 DIAGNOSIS — G47.33 OSA (OBSTRUCTIVE SLEEP APNEA): Primary | ICD-10-CM

## 2023-06-29 PROCEDURE — 99214 OFFICE O/P EST MOD 30 MIN: CPT | Performed by: PSYCHIATRY & NEUROLOGY

## 2023-06-29 RX ORDER — OMEPRAZOLE 40 MG/1
CAPSULE, DELAYED RELEASE ORAL
COMMUNITY
Start: 2023-05-16

## 2023-06-29 NOTE — PROGRESS NOTES
Assessment/Plan:    1  KRISTY (obstructive sleep apnea)  -     PAP DME Resupply/Reorder    2  RLS (restless legs syndrome)  -     Ferritin; Future; Expected date: 09/29/2023  -     Iron Panel (Includes Ferritin, Iron Sat%, Iron, and TIBC); Future; Expected date: 09/29/2023    3  Iron deficiency  -     Ferritin; Future; Expected date: 09/29/2023  -     Iron Panel (Includes Ferritin, Iron Sat%, Iron, and TIBC); Future; Expected date: 09/29/2023      We discussed that she is doing very well with CPAP treatment, she is consistently using her CPAP machine, treatment beneficial and effective  No change is needed in her current settings  With regard to restless leg syndrome, this is improved with use of over-the-counter iron  We discussed that the ferritin has dropped a little bit, it is not clear if this is related to the change from Vitron-C to generic preparation  I advised that she can decrease her use to 1 pill 3 days a week  I would like to recheck levels in 3 months  If she has worsened restless legs or ferritin further drops, that would support potentially changing back to Vitron-C  Another consideration in the future could be pursuing intravenous iron  As restless legs are improved and did not cause much disturbance, I would not pursue that at the present time  Despite low iron, she has not had signs of anemia, with a normal hemoglobin and hematocrit at last draw  Subjective:      Patient ID: Dionne Ruth is a 78 y o  female  HPI    Ms Jaci Villarreal shirlene in a follow-up visit  She was last seen by me 1 year ago  She has a history of obstructive sleep apnea (mild, respiratory event index 10) treated with CPAP as well as restless leg syndrome  She has had iron deficiency treated with iron supplementation  She just had a repeat iron panel and ferritin drawn, ferritin was 45 ng/mL, iron saturation 31%     She continues to follow with cardiology, has a history of paroxysmal atrial fibrillation, treated with "Xarelto  CPAP data reviewed today  AirSense 11 set at 5-15 cm H2O  AHI 1 1  Usage-30 out of 30 days, average session 8 hours 2 minutes  95% pressure-9 cm H2O  No significant mask leakage    She feels her RLS is better, a few times a week  This is better, used to be nightly  Does not keep her awake like it used to  Takes iron daily, no constipation  She goes to bed 9 pm, falls asleep quickly  Wakes 2 times a night, returns to sleep easily and is awake at 630 AM   Sleeps 8 hours a night    Feels refreshed when she wakes  No major sleepiness  With CPAP- no dryness, no nose bleeds, No air swallowing,  No nasal congestion  [place] Sleepiness Scale  Sitting and reading: Would never doze  Watching TV: Would never doze  Sitting, inactive in a public place (e g  a theatre or a meeting): Would never doze  As a passenger in a car for an hour without a break: Would never doze  Lying down to rest in the afternoon when circumstances permit: Moderate chance of dozing  Sitting and talking to someone: Would never doze  Sitting quietly after a lunch without alcohol: Would never doze  In a car, while stopped for a few minutes in traffic: Would never doze  Total score: 2      Review of Systems    No dyspnea, no coughing/wheezing    Occ palpitations  Denies constipation    Objective:      /56 (BP Location: Left arm, Patient Position: Sitting, Cuff Size: Large)   Pulse 66   Ht 5' 2\" (1 575 m)   Wt 66 7 kg (147 lb)   BMI 26 89 kg/m²          Physical Exam      RRR  Lungs CTA b/l  "

## 2023-06-30 ENCOUNTER — TELEPHONE (OUTPATIENT)
Dept: SLEEP CENTER | Facility: CLINIC | Age: 68
End: 2023-06-30

## 2023-06-30 LAB

## 2023-07-05 LAB
DME PARACHUTE DELIVERY DATE ACTUAL: NORMAL
DME PARACHUTE DELIVERY DATE REQUESTED: NORMAL
DME PARACHUTE ITEM DESCRIPTION: NORMAL
DME PARACHUTE ORDER STATUS: NORMAL
DME PARACHUTE SUPPLIER NAME: NORMAL
DME PARACHUTE SUPPLIER PHONE: NORMAL

## 2023-07-18 ENCOUNTER — PATIENT MESSAGE (OUTPATIENT)
Dept: SLEEP CENTER | Facility: CLINIC | Age: 68
End: 2023-07-18

## 2023-07-18 ENCOUNTER — TELEPHONE (OUTPATIENT)
Dept: SLEEP CENTER | Facility: CLINIC | Age: 68
End: 2023-07-18

## 2023-07-18 LAB

## 2023-07-18 NOTE — TELEPHONE ENCOUNTER
Per Alerts message from patient:  Hi, can you please send rx to [facility] for the supplies? I no longer use [facility]. Thanks    Rx for PAP resupply and clinical information sent to [facility] via [employer]. Patient made aware via Alerts reply.

## 2023-10-02 RX ORDER — FEXOFENADINE HCL 180 MG/1
180 TABLET ORAL 2 TIMES DAILY PRN
COMMUNITY

## 2023-10-18 NOTE — PROGRESS NOTES
Consultation - Electrophysiology-Cardiology (EP)   Katherleen Alpers 78 y.o. female MRN: [id number]  Unit/Bed#:  Encounter: [id number]      4. Paroxysmal atrial fibrillation (HCC)  POCT ECG      2. Obstructive sleep apnea        3. SVT (supraventricular tachycardia)        4. Primary hypertension        5. Current use of long term anticoagulation        6. Mixed hyperlipidemia        7. Overweight              Consults  Physician Requesting Consult: No ref.  provider found , originally Dr. French Washington from [facility] AT [facility]  Reason for Consult / Principal Problem:  Management of atrial fibrillation        Summary of my recommendation for the patient   Patient having difficult time  -  has parkinsonism and A-fib- she is primary caregiver -he may need therapy for A-fib as well as watchman-recommended to set up with Dr. Lucio Gilman or Dr. Jak Otero     She has episodes of palpitation once every 2 to 3 months and usually less than a couple of minutes    continue Xarelto 15 mg once daily + Continue metoprolol 25 mg 2 times daily    Home sleep study     If has episodes of palpitation/AFib, considering negative stress tests, use flecainide as a pill in the pocket - 150 mg once has palpitation , repeat 150 mg in 30 minutes if still continuing-total of 300 mg in 1 day   If palpitations are becoming more regular can use flecainide as a regular medication-50 or 100 mg 2 times daily      Needs to follow up with primary with regards to pruritus        Clinical Conditions  Palpitation , history of SVT  Primary Hypertension   AFib  Long-term anticoagulation, chads Vasc 3 and is on Eliquis  Mixed hyperlipidemia   PFO  Overweight  Obstructive sleep apnea          Assessment and plan     Atrial fibrillation  Long-term anticoagulation  Symptoms present for -approximately 12 months  Diagnosis is based on cardia, from prior cardiologist Dr Berna Manriquez  I do not have access to the strips    Patient occasionally gets palpitation   Her friend who is a doctor has reviewed her strips and said it was PACs  Usually happens once every 2 to 3 months    Risk factors   Age-67  Obesity-BMI 28  Obstructive sleep apnea -not been tested  Thyroid disorder -check TSH  Hypertension -present, on medication  Heart failure -systolic function retained  Diabetes mellitus -check hemoglobin A1c  Tobacco use-never smoker  Alcohol use-rare social  Energy drink use-none    Current therapy   Anticoagulation-chads Vasc [id number]-[id number]-old female with hypertension-on Xarelto  Rate control-starting metoprolol succinate 25 mg 2 times daily  Rhythm control-flecainide as pill in the pocket    My recommendation for this patient  Patient having difficult time  -  has parkinsonism and A-fib- she is primary caregiver -he may need therapy for A-fib as well as watchman-recommended to set up with Dr. Nataly Osuna or Dr. Amy Torres     She has episodes of palpitation once every 2 to 3 months and usually less than a couple of minutes    continue Xarelto 15 mg once daily + Continue metoprolol 25 mg 2 times daily    Home sleep study     If has episodes of palpitation/AFib, considering negative stress tests, use flecainide as a pill in the pocket - 150 mg once has palpitation , repeat 150 mg in 30 minutes if still continuing-total of 300 mg in 1 day   If palpitations are becoming more regular can use flecainide as a regular medication-50 or 100 mg 2 times daily              SVT   2016, resulted in ER visit, Terminated by Valsalva maneuver  Was well controlled on diltiazem - medication was discontinued as it was too expensive  Recurrence after the same  Patient is being started on metoprolol 25 mg 2 times daily      Obstructive sleep apnea   Occasional snoring, rare morning fatigue  Close association of atrial arrhythmias with sleep apnea  Recommend home sleep study      Hypertension   Blood pressure-126/82  Medication-metoprolol succinate, lisinopril, chlorthalidone  My recommendation-metoprolol succinate is new medication, will need to review blood pressure with primary to see if control is better      Mixed hyperlipidemia  Prior records show use of atorvastatin  No myositis or myalgia   Need to confirm with patient if She is still taking the same      Patent foramen ovale  Small   Not seen on echo in 2019      Pruritus  Patient is going to follow-up with her primary  She already is on fexofenadine          History of Present Illness   HPI: Reyes Camera is a 78y.o. year old female has been referred to me by PCP for the evaluation of SVT and atrial fibrillation    Patient having difficult time  -  has parkinsonism and A-fib- she is primary caregiver -he may need therapy for A-fib as well as watchman-recommended to set up with Dr. Rachel Kramer or Dr. Sumanth Alvarez     She has episodes of palpitation once every 2 to 3 months and usually less than a couple of minutes    The patient has significant medical illnesses which include  Palpitation , history of SVT  Primary Hypertension   AFib  Long-term anticoagulation, chads Vasc 3 and is on Eliquis  Mixed hyperlipidemia   PFO  Overweight  Obstructive sleep apnea    Notes from prior cardiologist, Dr. Prince Hull was reviewed  A) As far as atrial fibrillation is concerned, the patient follows with a [facility]   June 2021-AFib  January 2022-AFib  B) As far as SVT is concerned  2016, Patient was in the ED with rapid heart rate in the 240s, terminated with vagal maneuver  She was started on diltiazem with good control of symptoms  Diltiazem was too expensive and patient was switched to amlodipine in August 2020  She was having more frequent palpitations on amlodipine      As far as cardiac symptoms are concerned  Angina -occasional chest pain and pressure on exertion  Orthopnea -negative  Paroxysmal nocturnal dyspnea -negative  Leg swelling-some swelling while on amlodipine  Palpitations -present  Presyncope-rare  Syncope -negative  Orthostatic lightheadedness -occasional  Exertional intolerance-rare      Snoring-unsure  morning fatigue-occasional  Daytime sleepiness-rare      Social history  Tobacco use-never smoker  Alcohol use-rare social  Energy drink use-never used  Recreational drug use-never used      Family history  Hypertension    Historical Information   No past medical history on file. No past surgical history on file. Social History     Substance and Sexual Activity   Alcohol Use Not Currently     Social History     Substance and Sexual Activity   Drug Use Not Currently     Social History     Tobacco Use   Smoking Status Never   Smokeless Tobacco Never     Social History     Socioeconomic History    Marital status: /Civil Union     Spouse name: Not on file    Number of children: Not on file    Years of education: Not on file    Highest education level: Not on file   Occupational History    Not on file   Tobacco Use    Smoking status: Never    Smokeless tobacco: Never   Vaping Use    Vaping Use: Never used   Substance and Sexual Activity    Alcohol use: Not Currently    Drug use: Not Currently    Sexual activity: Not on file   Other Topics Concern    Not on file   Social History Narrative    Not on file     Social Determinants of Health     Financial Resource Strain: Not on file   Food Insecurity: Not on file   Transportation Needs: Not on file   Physical Activity: Not on file   Stress: Not on file   Social Connections: Not on file   Intimate Partner Violence: Not on file   Housing Stability: Not on file     . Family History:  Family History   Problem Relation Age of Onset    Sleep apnea Neg Hx     Restless legs syndrome Neg Hx          Meds/Allergies      No current facility-administered medications for this visit.         (Not in a hospital admission)      Allergies   Allergen Reactions    Peanut Oil - Food Allergy GI Intolerance, Rash and Anaphylaxis     flushed  flushed           Objective   Vitals:   Visit Vitals  Smoking Status Never      There were no vitals filed for this visit. [unfilled]    Invasive Devices       None                     ROS  Review of Systems   All other systems reviewed and are negative. As described in my history of present illness        PHYSICAL EXAM  Physical Exam  Vitals reviewed. Constitutional:       General: She is not in acute distress. Appearance: Normal appearance. She is not ill-appearing. HENT:      Head: Normocephalic and atraumatic. Right Ear: External ear normal.      Left Ear: External ear normal.      Nose: Nose normal.   Eyes:      General: No scleral icterus. Extraocular Movements: Extraocular movements intact. Conjunctiva/sclera: Conjunctivae normal.      Pupils: Pupils are equal, round, and reactive to light. Cardiovascular:      Rate and Rhythm: Normal rate and regular rhythm. Pulses: Normal pulses. Heart sounds: Normal heart sounds. No murmur heard. Pulmonary:      Effort: Pulmonary effort is normal. No respiratory distress. Breath sounds: Normal breath sounds. No wheezing. Abdominal:      General: Bowel sounds are normal. There is no distension. Tenderness: There is no abdominal tenderness. Musculoskeletal:         General: No swelling, tenderness or deformity. Cervical back: No rigidity. Skin:     Coloration: Skin is not jaundiced. Findings: No bruising. Neurological:      Mental Status: She is alert. Mental status is at baseline. Motor: No weakness. Psychiatric:         Mood and Affect: Mood normal.         Thought Content:  Thought content normal.         LAB RESULTS:      CBC:   Ref Range & Units 6/13/23  8:43 AM   Hemoglobin 11.5 - 14.5 g/dL 13.2   Hematocrit 35.0 - 43.0 % 39.1   WBC 4.0 - 10.0 thou/cmm 6.4   RBC 3.70 - 4.70 mill/cmm 4.35   Platelet Count 501 - 350 thou/cmm 285   MPV 7.5 - 11.3 fL 8.7   MCV 80 - 100 fL 90   MCH 26.0 - 34.0 pg 30.4   MCHC 32.0 - 37.0 g/dL 33.8   RDW 12.0 - 16.0 % 13.1   Differential Type  AUTO   Absolute Neutrophils 1.8 - 7.8 thou/cmm 3.8   Absolute Lymphocytes 1.0 - 3.0 thou/cmm 1.8   Absolute Monocytes 0.3 - 1.0 thou/cmm 0.6   Absolute Eosinophils 0.0 - 0.5 thou/cmm 0.1   Absolute Basophils 0.0 - 0.1 thou/cmm 0.1   Neutrophils % 59   Lymphocytes % 28   Monocytes % 10   Eosinophils % 2   Basophils % 1       CMP:   Ref Range & Units 6/13/23  8:43 AM   Glucose 65 - 99 mg/dL 101 High    BUN 7 - 25 mg/dL 11   Creatinine 0.40 - 1.10 mg/dL 0.72   Sodium 135 - 145 mmol/L 140   Potassium 3.5 - 5.2 mmol/L 3.8   Chloride 100 - 109 mmol/L 98 Low    Carbon Dioxide 21 - 31 mmol/L 30   Calcium 8.5 - 10.1 mg/dL 9.8   Alkaline Phosphatase 35 - 120 U/L 139 High    Albumin 3.5 - 5.7 g/dL 4.3   Bilirubin, Total 0.2 - 1.0 mg/dL 1.1 High    Comment: Eltrombopag and its metabolites may interfere with this assay causing erroneously high patient results. Protein, Total 6.3 - 8.3 g/dL 7.0   AST <41 U/L 27   ALT <56 U/L 20   Anion Gap 3 - 11 12 High    eGFRcr >59 92       Magnesium:   No results for input(s): "MG" in the last 8784 hours. A1C:  Results from Last 12 Months   Lab Units 06/13/23  0843   HEMOGLOBIN A1C % 6.5*        TSH:   Ref Range & Units 3/6/23  6:48 AM   Thyroid Stimulating Hormone 0.45 - 5.33 uIU/mL 3.88       Lipid Panel:    Ref Range & Units 3/6/23  6:48 AM    Cholesterol <200 mg/dL 181   Comment: Please note change in the reference range as of 02/22/23   Triglyceride <150 mg/dL 200 High    Cholesterol, HDL, Direct 23 - 92 mg/dL 43   Comment: Please note change in the reference range as of 02/22/23   Cholesterol, Non-HDL <160 mg/dL 138   Cholesterol, LDL, Calculated <130 mg/dL 98         Imaging:      EKG  08/30/2022          Echocardiogram:    Echo 2D Complete  01/19/2022    Narrative    This result has an attachment that is not available. Left Ventricle: Systolic function is normal with an ejection fraction  of 60-65%. Tricuspid Valve: There is mild regurgitation. Pulmonic Valve:  The estimated pulmonary artery systolic pressure is  25.2 mmHg. Left Ventricle  Left ventricle is normal in size. Wall thickness is normal. Systolic function is normal with an ejection fraction of 60-65%. Wall motion is within normal limits. There is normal diastolic function. Right Ventricle  Right ventricle cavity is normal. Systolic function is normal.    Left Atrium  Left atrium cavity size is normal.    Right Atrium  Right atrium cavity is normal.    IVC/SVC  The inferior vena cava demonstrates a diameter of <=21 mm and collapses >50%; therefore, the right atrial pressure is estimated at 0-5 mmHg. Mitral Valve  Mitral valve structure is normal. There is no regurgitation or stenosis. Tricuspid Valve  Tricuspid valve structure is normal. There is mild regurgitation. There is no evidence of tricuspid valve stenosis. Aortic Valve  The aortic valve is trileaflet. There is no regurgitation or stenosis. Pulmonic Valve  Pulmonic valve structure is normal. There is no regurgitation or stenosis. The estimated pulmonary artery systolic pressure is 27.0 mmHg. Ascending Aorta  The aorta appears normal in size. Pericardium  There is no pericardial effusion. Stress Test:     NM Myocardial Perfusion Spect (Rx Stress and/or Rest)  02/09/2022    Interpretation Summary      Stress ECG: No ST deviation is noted. Arrhythmias during recovery: rare PVCs. The ECG was negative for ischemia. The stress ECG is negative for ischemia. Perfusion: There are no perfusion defects. Stress Function: Left ventricular function post-stress is normal. Post-stress ejection fraction is 81 %. Stress Combined Conclusion: The ECG and SPECT imaging portions of the stress study are concordant with no evidence of stress induced myocardial ischemia. Left ventricular perfusion is normal.    Nuclear Stress Findings    Isotope Administration The isotope used for nuclear imaging was technetium tetrofosmin.  The isotope was administered intravenously via the left antecubital fossa. Imaging was performed at rest 58 minutes after an injection on 2/9/2022 at 08:02 EST of 10.80 mCi. Imaging was performed at peak stress 35 minutes after an injection on 2/9/2022 at 10:16 EST of 31.70 mCi. Nuclear Study Quality Images were viewed in the short axis, vertical long axis and horizontal long axis. Stress Findings A pharmacological stress test was performed using regadenoson. The patient and had a maximal HR of 122 bpm ( % of MPHR) and  METS. The patient experienced no angina during the test. The patient reached the end of the protocol. Low level exercise was used during the pharmacological stress test. The patient reported dyspnea during the stress test. Symptoms began during stress and ended during recovery. Blood pressure demonstrated a normal response to stress. Perfusion There are no perfusion defects. Stress Function Left ventricular function post-stress is normal. Post-stress ejection fraction is 81 %. General Findings The stress/rest perfusion ratio is 0.98 . There is no evidence of transient ischemic dilation (TID). The stress end diastolic cavity size is normal.          Cardiac catheterization :  No results found for this or any previous visit. DEVICE CHECK:    No results found for this or any previous visit.          Code Status: [unfilled]  Advance Directive and Living Will:      Power of :    POLST:      Counseling / Coordination of Care  Detailed discussion done about initiation of therapy for SVT, atrial fibrillation  Can use flecainide as pill in the pocket      [facility]

## 2023-10-23 ENCOUNTER — OFFICE VISIT (OUTPATIENT)
Dept: CARDIOLOGY CLINIC | Facility: CLINIC | Age: 68
End: 2023-10-23
Payer: MEDICARE

## 2023-10-23 VITALS
DIASTOLIC BLOOD PRESSURE: 80 MMHG | SYSTOLIC BLOOD PRESSURE: 121 MMHG | BODY MASS INDEX: 27.73 KG/M2 | WEIGHT: 151.6 LBS | HEART RATE: 55 BPM

## 2023-10-23 DIAGNOSIS — I48.0 PAROXYSMAL ATRIAL FIBRILLATION (HCC): Primary | ICD-10-CM

## 2023-10-23 DIAGNOSIS — E66.3 OVERWEIGHT: ICD-10-CM

## 2023-10-23 DIAGNOSIS — I10 PRIMARY HYPERTENSION: ICD-10-CM

## 2023-10-23 DIAGNOSIS — G47.33 OBSTRUCTIVE SLEEP APNEA: ICD-10-CM

## 2023-10-23 DIAGNOSIS — E78.2 MIXED HYPERLIPIDEMIA: ICD-10-CM

## 2023-10-23 DIAGNOSIS — I47.10 SVT (SUPRAVENTRICULAR TACHYCARDIA): ICD-10-CM

## 2023-10-23 DIAGNOSIS — Z79.01 CURRENT USE OF LONG TERM ANTICOAGULATION: ICD-10-CM

## 2023-10-23 PROCEDURE — 93000 ELECTROCARDIOGRAM COMPLETE: CPT | Performed by: INTERNAL MEDICINE

## 2023-10-23 PROCEDURE — 99213 OFFICE O/P EST LOW 20 MIN: CPT | Performed by: INTERNAL MEDICINE

## 2023-10-23 NOTE — LETTER
October 23, 2023     Burgess Marrufo, [address]    Patient: Dona Jamison   YOB: 1955   Date of Visit: 10/23/2023       Dear Dr. Kinsey Stahl: Thank you for referring Angelo Arguelles to me for evaluation. Below are my notes for this consultation. If you have questions, please do not hesitate to call me. I look forward to following your patient along with you. Sincerely,        Sterling Davies MD        CC: Sandeep Carlos. Charles Laughlin MD  10/23/2023 10:44 AM  Sign when Signing Visit   Consultation - Electrophysiology-Cardiology (EP)   Dona Jamison 78 y.o. female MRN: [id number]  Unit/Bed#:  Encounter: [id number]      5. Paroxysmal atrial fibrillation (HCC)  POCT ECG      2. Obstructive sleep apnea        3. SVT (supraventricular tachycardia)        4. Primary hypertension        5. Current use of long term anticoagulation        6. Mixed hyperlipidemia        7. Overweight              Consults  Physician Requesting Consult: No ref.  provider found , originally Dr. René Vanegas from [facility]  Reason for Consult / Principal Problem:  Management of atrial fibrillation        Summary of my recommendation for the patient   Patient having difficult time  -  has parkinsonism and A-fib- she is primary caregiver -he may need therapy for A-fib as well as watchman-recommended to set up with Dr. Flo Olson or Dr. Silviano Veliz     She has episodes of palpitation once every 2 to 3 months and usually less than a couple of minutes    continue Xarelto 15 mg once daily + Continue metoprolol 25 mg 2 times daily    Home sleep study     If has episodes of palpitation/AFib, considering negative stress tests, use flecainide as a pill in the pocket - 150 mg once has palpitation , repeat 150 mg in 30 minutes if still continuing-total of 300 mg in 1 day   If palpitations are becoming more regular can use flecainide as a regular medication-50 or 100 mg 2 times daily      Needs to follow up with primary with regards to pruritus        Clinical Conditions  Palpitation , history of SVT  Primary Hypertension   AFib  Long-term anticoagulation, chads Vasc 3 and is on Eliquis  Mixed hyperlipidemia   PFO  Overweight  Obstructive sleep apnea          Assessment and plan     Atrial fibrillation  Long-term anticoagulation  Symptoms present for -approximately 12 months  Diagnosis is based on cardia, from prior cardiologist Dr Annemarie Strickland  I do not have access to the strips    Patient occasionally gets palpitation   Her friend who is a doctor has reviewed her strips and said it was PACs  Usually happens once every 2 to 3 months    Risk factors   Age-67  Obesity-BMI 28  Obstructive sleep apnea -not been tested  Thyroid disorder -check TSH  Hypertension -present, on medication  Heart failure -systolic function retained  Diabetes mellitus -check hemoglobin A1c  Tobacco use-never smoker  Alcohol use-rare social  Energy drink use-none    Current therapy   Anticoagulation-chads Vasc [id number]-old female with hypertension-on Xarelto  Rate control-starting metoprolol succinate 25 mg 2 times daily  Rhythm control-flecainide as pill in the pocket    My recommendation for this patient  Patient having difficult time  -  has parkinsonism and A-fib- she is primary caregiver -he may need therapy for A-fib as well as watchman-recommended to set up with Dr. Cortez Walter or Dr. Cat Ponce     She has episodes of palpitation once every 2 to 3 months and usually less than a couple of minutes    continue Xarelto 15 mg once daily + Continue metoprolol 25 mg 2 times daily    Home sleep study     If has episodes of palpitation/AFib, considering negative stress tests, use flecainide as a pill in the pocket - 150 mg once has palpitation , repeat 150 mg in 30 minutes if still continuing-total of 300 mg in 1 day   If palpitations are becoming more regular can use flecainide as a regular medication-50 or 100 mg 2 times daily              SVT 2016, resulted in ER visit, Terminated by Valsalva maneuver  Was well controlled on diltiazem - medication was discontinued as it was too expensive  Recurrence after the same  Patient is being started on metoprolol 25 mg 2 times daily      Obstructive sleep apnea   Occasional snoring, rare morning fatigue  Close association of atrial arrhythmias with sleep apnea  Recommend home sleep study      Hypertension   Blood pressure-126/82  Medication-metoprolol succinate, lisinopril, chlorthalidone  My recommendation-metoprolol succinate is new medication, will need to review blood pressure with primary to see if control is better      Mixed hyperlipidemia  Prior records show use of atorvastatin  No myositis or myalgia   Need to confirm with patient if She is still taking the same      Patent foramen ovale  Small   Not seen on echo in 2019      Pruritus  Patient is going to follow-up with her primary  She already is on fexofenadine          History of Present Illness  HPI: Teri Herrera is a 78y.o. year old female has been referred to me by PCP for the evaluation of SVT and atrial fibrillation    Patient having difficult time  -  has parkinsonism and A-fib- she is primary caregiver -he may need therapy for A-fib as well as watchman-recommended to set up with Dr. Jb Israel or Dr. Olga Galvan     She has episodes of palpitation once every 2 to 3 months and usually less than a couple of minutes    The patient has significant medical illnesses which include  Palpitation , history of SVT  Primary Hypertension   AFib  Long-term anticoagulation, chads Vasc 3 and is on Eliquis  Mixed hyperlipidemia   PFO  Overweight  Obstructive sleep apnea    Notes from prior cardiologist, Dr. Carl Smith was reviewed  A) As far as atrial fibrillation is concerned, the patient follows with a Kardia   June 2021-AFib  January 2022-AFib  B) As far as SVT is concerned  2016, Patient was in the ED with rapid heart rate in the 240s, terminated with vagal maneuver  She was started on diltiazem with good control of symptoms  Diltiazem was too expensive and patient was switched to amlodipine in August 2020  She was having more frequent palpitations on amlodipine      As far as cardiac symptoms are concerned  Angina -occasional chest pain and pressure on exertion  Orthopnea -negative  Paroxysmal nocturnal dyspnea -negative  Leg swelling-some swelling while on amlodipine  Palpitations -present  Presyncope-rare  Syncope -negative  Orthostatic lightheadedness -occasional  Exertional intolerance-rare      Snoring-unsure  morning fatigue-occasional  Daytime sleepiness-rare      Social history  Tobacco use-never smoker  Alcohol use-rare social  Energy drink use-never used  Recreational drug use-never used      Family history  Hypertension    Historical Information  No past medical history on file. No past surgical history on file.   Social History     Substance and Sexual Activity   Alcohol Use Not Currently     Social History     Substance and Sexual Activity   Drug Use Not Currently     Social History     Tobacco Use   Smoking Status Never   Smokeless Tobacco Never     Social History     Socioeconomic History   • Marital status: /Civil Union     Spouse name: Not on file   • Number of children: Not on file   • Years of education: Not on file   • Highest education level: Not on file   Occupational History   • Not on file   Tobacco Use   • Smoking status: Never   • Smokeless tobacco: Never   Vaping Use   • Vaping Use: Never used   Substance and Sexual Activity   • Alcohol use: Not Currently   • Drug use: Not Currently   • Sexual activity: Not on file   Other Topics Concern   • Not on file   Social History Narrative   • Not on file     Social Determinants of Health     Financial Resource Strain: Not on file   Food Insecurity: Not on file   Transportation Needs: Not on file   Physical Activity: Not on file   Stress: Not on file   Social Connections: Not on file   Intimate Partner Violence: Not on file   Housing Stability: Not on file     . Family History:  Family History   Problem Relation Age of Onset   • Sleep apnea Neg Hx    • Restless legs syndrome Neg Hx          Meds/Allergies     No current facility-administered medications for this visit. (Not in a hospital admission)      Allergies   Allergen Reactions   • Peanut Oil - Food Allergy GI Intolerance, Rash and Anaphylaxis     flushed  flushed           Objective  Vitals:   Visit Vitals  Smoking Status Never      There were no vitals filed for this visit. [unfilled]    Invasive Devices       None                     ROS  Review of Systems   All other systems reviewed and are negative. As described in my history of present illness        PHYSICAL EXAM  Physical Exam  Vitals reviewed. Constitutional:       General: She is not in acute distress. Appearance: Normal appearance. She is not ill-appearing. HENT:      Head: Normocephalic and atraumatic. Right Ear: External ear normal.      Left Ear: External ear normal.      Nose: Nose normal.   Eyes:      General: No scleral icterus. Extraocular Movements: Extraocular movements intact. Conjunctiva/sclera: Conjunctivae normal.      Pupils: Pupils are equal, round, and reactive to light. Cardiovascular:      Rate and Rhythm: Normal rate and regular rhythm. Pulses: Normal pulses. Heart sounds: Normal heart sounds. No murmur heard. Pulmonary:      Effort: Pulmonary effort is normal. No respiratory distress. Breath sounds: Normal breath sounds. No wheezing. Abdominal:      General: Bowel sounds are normal. There is no distension. Tenderness: There is no abdominal tenderness. Musculoskeletal:         General: No swelling, tenderness or deformity. Cervical back: No rigidity. Skin:     Coloration: Skin is not jaundiced. Findings: No bruising. Neurological:      Mental Status: She is alert. Mental status is at baseline. Motor: No weakness. Psychiatric:         Mood and Affect: Mood normal.         Thought Content: Thought content normal.         LAB RESULTS:      CBC:   Ref Range & Units 6/13/23  8:43 AM   Hemoglobin 11.5 - 14.5 g/dL 13.2   Hematocrit 35.0 - 43.0 % 39.1   WBC 4.0 - 10.0 thou/cmm 6.4   RBC 3.70 - 4.70 mill/cmm 4.35   Platelet Count 429 - 350 thou/cmm 285   MPV 7.5 - 11.3 fL 8.7   MCV 80 - 100 fL 90   MCH 26.0 - 34.0 pg 30.4   MCHC 32.0 - 37.0 g/dL 33.8   RDW 12.0 - 16.0 % 13.1   Differential Type  AUTO   Absolute Neutrophils 1.8 - 7.8 thou/cmm 3.8   Absolute Lymphocytes 1.0 - 3.0 thou/cmm 1.8   Absolute Monocytes 0.3 - 1.0 thou/cmm 0.6   Absolute Eosinophils 0.0 - 0.5 thou/cmm 0.1   Absolute Basophils 0.0 - 0.1 thou/cmm 0.1   Neutrophils % 59   Lymphocytes % 28   Monocytes % 10   Eosinophils % 2   Basophils % 1       CMP:   Ref Range & Units 6/13/23  8:43 AM   Glucose 65 - 99 mg/dL 101 High    BUN 7 - 25 mg/dL 11   Creatinine 0.40 - 1.10 mg/dL 0.72   Sodium 135 - 145 mmol/L 140   Potassium 3.5 - 5.2 mmol/L 3.8   Chloride 100 - 109 mmol/L 98 Low    Carbon Dioxide 21 - 31 mmol/L 30   Calcium 8.5 - 10.1 mg/dL 9.8   Alkaline Phosphatase 35 - 120 U/L 139 High    Albumin 3.5 - 5.7 g/dL 4.3   Bilirubin, Total 0.2 - 1.0 mg/dL 1.1 High    Comment: Eltrombopag and its metabolites may interfere with this assay causing erroneously high patient results. Protein, Total 6.3 - 8.3 g/dL 7.0   AST <41 U/L 27   ALT <56 U/L 20   Anion Gap 3 - 11 12 High    eGFRcr >59 92       Magnesium:   No results for input(s): "MG" in the last 8784 hours.     A1C:  Results from Last 12 Months   Lab Units 06/13/23  0843   HEMOGLOBIN A1C % 6.5*        TSH:   Ref Range & Units 3/6/23  6:48 AM   Thyroid Stimulating Hormone 0.45 - 5.33 uIU/mL 3.88       Lipid Panel:    Ref Range & Units 3/6/23  6:48 AM    Cholesterol <200 mg/dL 181   Comment: Please note change in the reference range as of 02/22/23   Triglyceride <150 mg/dL 200 High    Cholesterol, HDL, Direct 23 - 92 mg/dL 43   Comment: Please note change in the reference range as of 02/22/23   Cholesterol, Non-HDL <160 mg/dL 138   Cholesterol, LDL, Calculated <130 mg/dL 98         Imaging:      EKG  08/30/2022          Echocardiogram:    Echo 2D Complete  01/19/2022    Narrative    This result has an attachment that is not available. •  Left Ventricle: Systolic function is normal with an ejection fraction  of 60-65%. •  Tricuspid Valve: There is mild regurgitation. •  Pulmonic Valve: The estimated pulmonary artery systolic pressure is  20.2 mmHg. Left Ventricle  Left ventricle is normal in size. Wall thickness is normal. Systolic function is normal with an ejection fraction of 60-65%. Wall motion is within normal limits. There is normal diastolic function. Right Ventricle  Right ventricle cavity is normal. Systolic function is normal.    Left Atrium  Left atrium cavity size is normal.    Right Atrium  Right atrium cavity is normal.    IVC/SVC  The inferior vena cava demonstrates a diameter of <=21 mm and collapses >50%; therefore, the right atrial pressure is estimated at 0-5 mmHg. Mitral Valve  Mitral valve structure is normal. There is no regurgitation or stenosis. Tricuspid Valve  Tricuspid valve structure is normal. There is mild regurgitation. There is no evidence of tricuspid valve stenosis. Aortic Valve  The aortic valve is trileaflet. There is no regurgitation or stenosis. Pulmonic Valve  Pulmonic valve structure is normal. There is no regurgitation or stenosis. The estimated pulmonary artery systolic pressure is 17.2 mmHg. Ascending Aorta  The aorta appears normal in size. Pericardium  There is no pericardial effusion. Stress Test:     NM Myocardial Perfusion Spect (Rx Stress and/or Rest)  02/09/2022    Interpretation Summary    •  Stress ECG: No ST deviation is noted. Arrhythmias during recovery: rare PVCs. The ECG was negative for ischemia.  The stress ECG is negative for ischemia. •  Perfusion: There are no perfusion defects. •  Stress Function: Left ventricular function post-stress is normal. Post-stress ejection fraction is 81 %. •  Stress Combined Conclusion: The ECG and SPECT imaging portions of the stress study are concordant with no evidence of stress induced myocardial ischemia. Left ventricular perfusion is normal.    Nuclear Stress Findings    Isotope Administration The isotope used for nuclear imaging was technetium tetrofosmin. The isotope was administered intravenously via the left antecubital fossa. Imaging was performed at rest 58 minutes after an injection on 2/9/2022 at 08:02 EST of 10.80 mCi. Imaging was performed at peak stress 35 minutes after an injection on 2/9/2022 at 10:16 EST of 31.70 mCi. Nuclear Study Quality Images were viewed in the short axis, vertical long axis and horizontal long axis. Stress Findings A pharmacological stress test was performed using regadenoson. The patient and had a maximal HR of 122 bpm ( % of MPHR) and  METS. The patient experienced no angina during the test. The patient reached the end of the protocol. Low level exercise was used during the pharmacological stress test. The patient reported dyspnea during the stress test. Symptoms began during stress and ended during recovery. Blood pressure demonstrated a normal response to stress. Perfusion There are no perfusion defects. Stress Function Left ventricular function post-stress is normal. Post-stress ejection fraction is 81 %. General Findings The stress/rest perfusion ratio is 0.98 . There is no evidence of transient ischemic dilation (TID). The stress end diastolic cavity size is normal.          Cardiac catheterization :  No results found for this or any previous visit. DEVICE CHECK:    No results found for this or any previous visit.          Code Status: [unfilled]  Advance Directive and Living Will:      Power of : POLST:      Counseling / Coordination of Care  Detailed discussion done about initiation of therapy for SVT, atrial fibrillation  Can use flecainide as pill in the pocket      Elliott Nuno

## 2023-11-01 ENCOUNTER — TELEPHONE (OUTPATIENT)
Dept: CARDIOLOGY CLINIC | Facility: CLINIC | Age: 68
End: 2023-11-01

## 2023-11-01 NOTE — TELEPHONE ENCOUNTER
Pt called stating she is having high BP. She went to her PCP this morning and it was 125/76. PCP said it was normal. She c/o a headache and no other cardiac symp. Her BP are higher when she's at home. BP in the AM - 194/110  BP currently - 170/108    She is currently taking chlorthalidone 12.5mg, metoprolol succ 25mg BID, Xarelto 15mg daily, flecainide 150mg PRN, and lisinopril 10mg. Please advise. Thank you!

## 2023-11-02 NOTE — TELEPHONE ENCOUNTER
Spoke with patient. She has an appointment scheduled with another PCP for a second opinion regarding her BP.

## 2023-11-02 NOTE — TELEPHONE ENCOUNTER
Arabella Foster for pt to return office call to discuss f/u with her PCP or general cardiology per Sophia Figueroa PA-C.

## 2023-11-09 DIAGNOSIS — I47.10 SVT (SUPRAVENTRICULAR TACHYCARDIA): ICD-10-CM

## 2023-11-09 RX ORDER — METOPROLOL SUCCINATE 50 MG/1
TABLET, EXTENDED RELEASE ORAL
Qty: 180 TABLET | Refills: 3 | Status: SHIPPED | OUTPATIENT
Start: 2023-11-09

## 2023-12-07 DIAGNOSIS — I47.10 SVT (SUPRAVENTRICULAR TACHYCARDIA): ICD-10-CM

## 2023-12-07 RX ORDER — RIVAROXABAN 15 MG/1
TABLET, FILM COATED ORAL
Qty: 90 TABLET | Refills: 3 | Status: SHIPPED | OUTPATIENT
Start: 2023-12-07

## 2023-12-18 DIAGNOSIS — I10 PRIMARY HYPERTENSION: ICD-10-CM

## 2023-12-18 RX ORDER — CHLORTHALIDONE 25 MG/1
12.5 TABLET ORAL DAILY
Qty: 45 TABLET | Refills: 3 | Status: SHIPPED | OUTPATIENT
Start: 2023-12-18 | End: 2024-12-17

## 2023-12-18 NOTE — TELEPHONE ENCOUNTER
----- Message from Maria D Palomo sent at 12/18/2023  6:46 AM EST -----  Regarding: Chlorthalidone  Contact: [phone]  Can you please send rx for Chlorthalidone 25 mg. #45 to [facility] in [place]?

## 2023-12-18 NOTE — TELEPHONE ENCOUNTER
Requested medication(s) are due for refill today: Yes  Patient has already received a courtesy refill: No  Other reason request has been forwarded to provider: failed protocol

## 2024-08-05 ENCOUNTER — PATIENT MESSAGE (OUTPATIENT)
Dept: SLEEP CENTER | Facility: CLINIC | Age: 69
End: 2024-08-05

## 2024-08-05 DIAGNOSIS — E83.10 DISORDER OF IRON METABOLISM: Primary | ICD-10-CM

## 2024-09-17 ENCOUNTER — OFFICE VISIT (OUTPATIENT)
Dept: SLEEP CENTER | Facility: CLINIC | Age: 69
End: 2024-09-17
Payer: MEDICARE

## 2024-09-17 VITALS
WEIGHT: 148 LBS | SYSTOLIC BLOOD PRESSURE: 121 MMHG | HEIGHT: 62 IN | DIASTOLIC BLOOD PRESSURE: 60 MMHG | BODY MASS INDEX: 27.23 KG/M2

## 2024-09-17 DIAGNOSIS — E83.10 DISORDER OF IRON METABOLISM: ICD-10-CM

## 2024-09-17 DIAGNOSIS — G47.33 OBSTRUCTIVE SLEEP APNEA: ICD-10-CM

## 2024-09-17 DIAGNOSIS — G47.33 OSA (OBSTRUCTIVE SLEEP APNEA): Primary | ICD-10-CM

## 2024-09-17 DIAGNOSIS — G25.81 RLS (RESTLESS LEGS SYNDROME): ICD-10-CM

## 2024-09-17 PROCEDURE — 99213 OFFICE O/P EST LOW 20 MIN: CPT | Performed by: PSYCHIATRY & NEUROLOGY

## 2024-09-17 PROCEDURE — G2211 COMPLEX E/M VISIT ADD ON: HCPCS | Performed by: PSYCHIATRY & NEUROLOGY

## 2024-09-17 RX ORDER — BACILLUS COAGULANS 1B CELL
CAPSULE ORAL
COMMUNITY

## 2024-09-17 NOTE — ASSESSMENT & PLAN NOTE
-Mild obstructive sleep apnea, she continues to do great with CPAP.  Her adherence is excellent and her AHI is normal.  Treatment is beneficial and effective  -Will reorder supplies to keep the prescription current and follow-up with her in 1 year

## 2024-09-17 NOTE — ASSESSMENT & PLAN NOTE
-RLS is not active, would continue iron supplementation  -Most recent iron panel is acceptable, we will repeat an iron panel in 3 to 4 months.  She also takes magnesium supplements will check that level as well

## 2024-09-17 NOTE — PROGRESS NOTES
Assessment/Plan:    1. KRISTY (obstructive sleep apnea)  -     PAP DME Resupply/Reorder  2. RLS (restless legs syndrome)  Assessment & Plan:  -RLS is not active, would continue iron supplementation  -Most recent iron panel is acceptable, we will repeat an iron panel in 3 to 4 months.  She also takes magnesium supplements will check that level as well  Orders:  -     Iron Panel (Includes Ferritin, Iron Sat%, Iron, and TIBC); Future; Expected date: 01/17/2025  -     Magnesium; Future; Expected date: 01/17/2025  -     Ferritin; Future; Expected date: 01/17/2025  3. Disorder of iron metabolism  -     Iron Panel (Includes Ferritin, Iron Sat%, Iron, and TIBC); Future; Expected date: 01/17/2025  -     Magnesium; Future; Expected date: 01/17/2025  -     Ferritin; Future; Expected date: 01/17/2025  4. Obstructive sleep apnea  Assessment & Plan:  -Mild obstructive sleep apnea, she continues to do great with CPAP.  Her adherence is excellent and her AHI is normal.  Treatment is beneficial and effective  -Will reorder supplies to keep the prescription current and follow-up with her in 1 year        Subjective:      Patient ID: Maria D Palomo is a 68 y.o. female.    LUPE Killian returns in a follow-up visit for history of obstructive sleep apnea treated with CPAP as well as a history of restless legs in the past Hx mild KRISTY, AKOSUA 10 on a HST in 2022    She notes that she is happy with her sleep quality and level of alertness during the day.  She finds CPAP to be beneficial, she sleeps better with it.  Nothing limits her adherence.  She describes dry mouth as a side effect, does not have other side effects    She was a bed at 9 PM, is asleep within 15 minutes.  She has 1 or 2 wakings during the night but typically falls back asleep without difficulty.  She is up at 6 AM, sleeps about 8 hours a night.    She does not feel sleepy during the day, does not take naps and does not doze.    She has not had recent symptoms of restless legs.  She  "had this in the past and has been taking  Vitron-C, takes it daily       CPAP data reviewed today  AirSense 11 set at 5 to 15 cm H2O  Average session-7 hours 51 minutes, usage 89 over 90 days, 94% more than 4 hours  AHI 0.7  No significant mask leakage  95% pressure-9.8 cm H2O  Uses nasal pillows     Caffeine- max 3 cups of tea a day   No alcohol       [place] Sleepiness Scale  Sitting and reading: (P) Would never doze  Watching TV: (P) Would never doze  Sitting, inactive in a public place (e.g. a theatre or a meeting): (P) Would never doze  As a passenger in a car for an hour without a break: (P) Would never doze  Lying down to rest in the afternoon when circumstances permit: (P) Slight chance of dozing  Sitting and talking to someone: (P) Would never doze  Sitting quietly after a lunch without alcohol: (P) Would never doze  In a car, while stopped for a few minutes in traffic: (P) Would never doze  Total score: (P) 1      Review of Systems  (As above unless noted)    Objective:      /60 (BP Location: Left arm, Patient Position: Sitting, Cuff Size: Adult)   Ht 5' 2\" (1.575 m)   Wt 67.1 kg (148 lb)   BMI 27.07 kg/m²          Physical Exam    RRR  Lungs CTA b/l  In NAD  Mallampati 4 airway  Large tongue with scalloping  "

## 2024-09-18 ENCOUNTER — TELEPHONE (OUTPATIENT)
Dept: SLEEP CENTER | Facility: CLINIC | Age: 69
End: 2024-09-18

## 2024-09-18 LAB

## 2024-09-18 NOTE — TELEPHONE ENCOUNTER
Rx for PAP resupply and office note sent to [facility] via [employer].    Patient notified via [employer].

## 2024-10-13 DIAGNOSIS — I47.10 SVT (SUPRAVENTRICULAR TACHYCARDIA) (HCC): ICD-10-CM

## 2024-10-15 ENCOUNTER — TELEPHONE (OUTPATIENT)
Dept: CARDIOLOGY CLINIC | Facility: CLINIC | Age: 69
End: 2024-10-15

## 2024-10-15 RX ORDER — METOPROLOL SUCCINATE 50 MG/1
TABLET, EXTENDED RELEASE ORAL
Qty: 180 TABLET | Refills: 0 | Status: SHIPPED | OUTPATIENT
Start: 2024-10-15

## 2024-12-08 DIAGNOSIS — I47.10 SVT (SUPRAVENTRICULAR TACHYCARDIA) (HCC): ICD-10-CM

## 2024-12-10 RX ORDER — RIVAROXABAN 15 MG/1
15 TABLET, FILM COATED ORAL
Qty: 90 TABLET | Refills: 3 | Status: SHIPPED | OUTPATIENT
Start: 2024-12-10

## 2024-12-12 DIAGNOSIS — Z00.6 ENCOUNTER FOR EXAMINATION FOR NORMAL COMPARISON OR CONTROL IN CLINICAL RESEARCH PROGRAM: ICD-10-CM

## 2024-12-18 NOTE — TELEPHONE ENCOUNTER
CANDACE with normal stress test results per Bryn Walsh  Referral for procedure from PAT appointment      Spoke to pt to schedule procedure(s) Colonoscopy       Physician to perform procedure(s) Dr. QUE Conte  Date of Procedure (s) 02/04/25  Arrival Time 8:30 AM  Time of Procedure(s) 9:30 AM   Location of Procedure(s) [place] 2nd Floor  Type of Rx Prep sent to patient: PEG  Instructions provided to patient via [facility]    Patient was informed on the following information and verbalized understanding. Screening questionnaire reviewed with patient and complete. If procedure requires anesthesia, a responsible adult needs to be present to accompany the patient home, patient cannot drive after receiving anesthesia. Appointment details are tentative, especially check-in time. Patient will receive a prep-op call 7 days prior to confirm check-in time for procedure. If applicable the patient should contact their pharmacy to verify Rx for procedure prep is ready for pick-up. Patient was advised to call the scheduling department at [phone] if pharmacy states no Rx is available. Patient was advised to call the endoscopy scheduling department if any questions or concerns arise.      [facility] Scheduling Department

## 2024-12-29 DIAGNOSIS — I47.10 SVT (SUPRAVENTRICULAR TACHYCARDIA) (HCC): ICD-10-CM

## 2024-12-31 RX ORDER — METOPROLOL SUCCINATE 50 MG/1
50 TABLET, EXTENDED RELEASE ORAL 2 TIMES DAILY
Qty: 180 TABLET | Refills: 3 | Status: SHIPPED | OUTPATIENT
Start: 2024-12-31

## 2025-01-10 ENCOUNTER — OFFICE VISIT (OUTPATIENT)
Dept: CARDIOLOGY CLINIC | Facility: CLINIC | Age: 70
End: 2025-01-10
Payer: MEDICARE

## 2025-01-10 VITALS
DIASTOLIC BLOOD PRESSURE: 74 MMHG | WEIGHT: 148.5 LBS | SYSTOLIC BLOOD PRESSURE: 118 MMHG | HEART RATE: 51 BPM | BODY MASS INDEX: 27.16 KG/M2

## 2025-01-10 DIAGNOSIS — E78.2 MIXED HYPERLIPIDEMIA: ICD-10-CM

## 2025-01-10 DIAGNOSIS — E66.3 OVERWEIGHT: ICD-10-CM

## 2025-01-10 DIAGNOSIS — I47.10 SVT (SUPRAVENTRICULAR TACHYCARDIA) (HCC): Primary | ICD-10-CM

## 2025-01-10 DIAGNOSIS — I48.0 PAROXYSMAL ATRIAL FIBRILLATION (HCC): ICD-10-CM

## 2025-01-10 DIAGNOSIS — G47.33 OBSTRUCTIVE SLEEP APNEA: ICD-10-CM

## 2025-01-10 DIAGNOSIS — I10 PRIMARY HYPERTENSION: ICD-10-CM

## 2025-01-10 DIAGNOSIS — Z79.01 CURRENT USE OF LONG TERM ANTICOAGULATION: ICD-10-CM

## 2025-01-10 DIAGNOSIS — Q21.12 PFO (PATENT FORAMEN OVALE): ICD-10-CM

## 2025-01-10 PROCEDURE — 99214 OFFICE O/P EST MOD 30 MIN: CPT | Performed by: INTERNAL MEDICINE

## 2025-01-10 PROCEDURE — 93000 ELECTROCARDIOGRAM COMPLETE: CPT | Performed by: INTERNAL MEDICINE

## 2025-01-10 NOTE — PROGRESS NOTES
Consultation - Electrophysiology-Cardiology (EP)   Maria D Palomo 69 y.o. female MRN: [id number]  Unit/Bed#:  Encounter: [id number]      1. SVT (supraventricular tachycardia) (HCC)  POCT ECG      2. Paroxysmal atrial fibrillation (HCC)        3. Primary hypertension        4. PFO (patent foramen ovale)        5. Obstructive sleep apnea        6. Current use of long term anticoagulation        7. Mixed hyperlipidemia        8. Overweight                    Summary of my recommendation for the patient     F/u in a year     Palpitations-once every 2 to 3 months-less than a couple of minutes-currently on Xarelto metoprolol -negative stress test in 2022-use flecainide as a pill in the pocket-if symptoms worsen can use flecainide around-the-clock or proceed with ablation    Patient having difficult time  -  has parkinsonism and A-fib- she is primary caregiver -he may need therapy for A-fib as well as watchman-recommended to set up with Dr. Pascual or Dr. Bettencourt         Clinical Conditions  Palpitation , history of SVT  Primary Hypertension   AFib  Long-term anticoagulation, chads Vasc 3 and is on Eliquis  Mixed hyperlipidemia   PFO  Overweight  Obstructive sleep apnea          Assessment and plan     Atrial fibrillation  Long-term anticoagulation  Symptoms present for -approximately 12 months  Diagnosis is based on cardia, from prior cardiologist Dr Cole  I do not have access to the strips    Patient occasionally gets palpitation   Her friend who is a doctor has reviewed her strips and said it was PACs  Usually happens once every 2 to 3 months    Risk factors   Age-67  Obesity-BMI 28  Obstructive sleep apnea -not been tested  Thyroid disorder -check TSH  Hypertension -present, on medication  Heart failure -systolic function retained  Diabetes mellitus -check hemoglobin A1c  Tobacco use-never smoker  Alcohol use-rare social  Energy drink use-none    Current therapy   Anticoagulation-chads Vasc score-3-66-year-old  female with hypertension-on Xarelto  Rate control-starting metoprolol succinate 25 mg 2 times daily  Rhythm control-flecainide as pill in the pocket    My recommendation for this patient  Palpitations-once every 2 to 3 months-less than a couple of minutes-currently on Xarelto metoprolol -negative stress test in 2022-use flecainide as a pill in the pocket-if symptoms worsen can use flecainide around-the-clock or proceed with ablation    Patient having difficult time  -  has parkinsonism and A-fib- she is primary caregiver -he may need therapy for A-fib as well as watchman-recommended to set up with Dr. Pascual or Dr. Bettencourt               SVT   2016, resulted in ER visit, Terminated by Valsalva maneuver  Was well controlled on diltiazem - medication was discontinued as it was too expensive  Recurrence after the same  Patient is being started on metoprolol 25 mg 2 times daily      Obstructive sleep apnea   Occasional snoring, rare morning fatigue  Close association of atrial arrhythmias with sleep apnea  Recommend home sleep study      Hypertension   Blood pressure-118/74  Medication-metoprolol succinate, lisinopril, chlorthalidone  My recommendation-metoprolol succinate is new medication, will need to review blood pressure with primary to see if control is better      Mixed hyperlipidemia  Prior records show use of atorvastatin  No myositis or myalgia   Need to confirm with patient if She is still taking the same      Patent foramen ovale  Small   Not seen on echo in 2019      Pruritus  Patient is going to follow-up with her primary  She already is on fexofenadine          History of Present Illness   HPI: Maria D Palomo is a 69 y.o. year old female has been referred to me by PCP for the evaluation of SVT and atrial fibrillation    Patient doing well currently, 2-3 brief episodes in the last 12 months    Palpitations-once every 2 to 3 months-less than a couple of minutes-currently on Xarelto + metoprolol  -negative stress test in 2022-use flecainide as a pill in the pocket-if symptoms worsen can use flecainide around-the-clock or proceed with ablation    Patient having difficult time  -  has parkinsonism and A-fib- she is primary caregiver -he may need therapy for A-fib as well as watchman-recommended to set up with Dr. Pascual or Dr. Bettencourt     The patient has significant medical illnesses which include  Palpitation , history of SVT  Primary Hypertension   AFib  Long-term anticoagulation, chads Vasc 3 and is on Eliquis  Mixed hyperlipidemia   PFO  Overweight  Obstructive sleep apnea    Notes from prior cardiologist, Dr. Cole was reviewed  A) As far as atrial fibrillation is concerned, the patient follows with a Kardia   June 2021-AFib  January 2022-AFib  B) As far as SVT is concerned  2016, Patient was in the ED with rapid heart rate in the 240s, terminated with vagal maneuver  She was started on diltiazem with good control of symptoms  Diltiazem was too expensive and patient was switched to amlodipine in August 2020  She was having more frequent palpitations on amlodipine      As far as cardiac symptoms are concerned  Angina -occasional chest pain and pressure on exertion  Orthopnea -negative  Paroxysmal nocturnal dyspnea -negative  Leg swelling-some swelling while on amlodipine  Palpitations -present  Presyncope-rare  Syncope -negative  Orthostatic lightheadedness -occasional  Exertional intolerance-rare      Snoring-unsure  morning fatigue-occasional  Daytime sleepiness-rare      Social history  Tobacco use-never smoker  Alcohol use-rare social  Energy drink use-never used  Recreational drug use-never used      Family history  Hypertension    Historical Information   No past medical history on file.  No past surgical history on file.  Social History     Substance and Sexual Activity   Alcohol Use Not Currently     Social History     Substance and Sexual Activity   Drug Use Not Currently     Social  History     Tobacco Use   Smoking Status Never   Smokeless Tobacco Never     Social History     Socioeconomic History    Marital status: /Civil Union     Spouse name: Not on file    Number of children: Not on file    Years of education: Not on file    Highest education level: Not on file   Occupational History    Not on file   Tobacco Use    Smoking status: Never    Smokeless tobacco: Never   Vaping Use    Vaping status: Never Used   Substance and Sexual Activity    Alcohol use: Not Currently    Drug use: Not Currently    Sexual activity: Not on file   Other Topics Concern    Not on file   Social History Narrative    Not on file     Social Drivers of Health     Financial Resource Strain: Medium Risk (7/4/2024)    Received from [facility]    Overall Financial Resource Strain (CARDIA)     Difficulty of Paying Living Expenses: Somewhat hard   Food Insecurity: No Food Insecurity (7/4/2024)    Received from [facility]    Hunger Vital Sign     Worried About Running Out of Food in the Last Year: Never true     Ran Out of Food in the Last Year: Never true   Transportation Needs: No Transportation Needs (7/4/2024)    Received from [facility]    PRAPARE - Transportation     Lack of Transportation (Medical): No     Lack of Transportation (Non-Medical): No   Physical Activity: Not on file   Stress: Stress Concern Present (7/4/2024)    Received from [facility]    [employer] - Occupational Stress Questionnaire     Feeling of Stress : To some extent   Social Connections: Feeling Socially Integrated (7/4/2024)    Received from [facility]    OASIS : Social Isolation     How often do you feel lonely or isolated from those around you?: Rarely   Intimate Partner Violence: Not At Risk (7/4/2024)    Received from [facility]    Humiliation, Afraid, Rape, and Kick questionnaire     Fear of  Current or Ex-Partner: No     Emotionally Abused: No     Physically Abused: No     Sexually Abused: No   Housing Stability: Unknown (7/4/2024)    Received from [facility]    Housing Stability Vital Sign     Unable to Pay for Housing in the Last Year: Not on file     Number of Times Moved in the Last Year: 0     Homeless in the Last Year: No     .  Family History:  Family History   Problem Relation Age of Onset    Sleep apnea Neg Hx     Restless legs syndrome Neg Hx          Meds/Allergies      No current facility-administered medications for this visit.       Not in a hospital admission.      Allergies   Allergen Reactions    Peanut Oil - Food Allergy GI Intolerance, Rash and Anaphylaxis     flushed  flushed           Objective   Vitals:   Visit Vitals  /74 (BP Location: Right arm, Patient Position: Sitting, Cuff Size: Standard)   Pulse (!) 51   Wt 67.4 kg (148 lb 8 oz)   BMI 27.16 kg/m²   Smoking Status Never   BSA 1.68 m²      Vitals:    01/10/25 1125   Weight: 67.4 kg (148 lb 8 oz)   [unfilled]    Invasive Devices       None                     ROS  Review of Systems   All other systems reviewed and are negative.  As described in my history of present illness        PHYSICAL EXAM  Physical Exam  Vitals reviewed.   Constitutional:       General: She is not in acute distress.     Appearance: Normal appearance. She is not ill-appearing.   HENT:      Head: Normocephalic and atraumatic.      Right Ear: External ear normal.      Left Ear: External ear normal.      Nose: Nose normal.   Eyes:      General: No scleral icterus.     Extraocular Movements: Extraocular movements intact.      Conjunctiva/sclera: Conjunctivae normal.      Pupils: Pupils are equal, round, and reactive to light.   Cardiovascular:      Rate and Rhythm: Normal rate and regular rhythm.      Pulses: Normal pulses.      Heart sounds: Normal heart sounds. No murmur heard.  Pulmonary:      Effort: Pulmonary effort is normal. No  respiratory distress.      Breath sounds: Normal breath sounds. No wheezing.   Abdominal:      General: Bowel sounds are normal. There is no distension.      Tenderness: There is no abdominal tenderness.   Musculoskeletal:         General: No swelling, tenderness or deformity.      Cervical back: No rigidity.   Skin:     Coloration: Skin is not jaundiced.      Findings: No bruising.   Neurological:      Mental Status: She is alert. Mental status is at baseline.      Motor: No weakness.   Psychiatric:         Mood and Affect: Mood normal.         Thought Content: Thought content normal.         LAB RESULTS:    CBC:  Component  Ref Range & Units 10/25/23  8:17 AM   Hemoglobin  11.5 - 14.5 g/dL 12.4   Hematocrit  35.0 - 43.0 % 35.9   WBC  4.0 - 10.0 thou/cmm 6.5   RBC  3.70 - 4.70 mill/cmm 4.00   Platelet  140 - 350 thou/cmm 253   MPV  7.5 - 11.3 fL 8.9   MCV  80 - 100 fL 90   MCH  26.0 - 34.0 pg 31.0   MCHC  32.0 - 37.0 g/dL 34.6   RDW  12.0 - 16.0 % 13.3   Differential Type AUTO   Absolute Neutrophils  1.8 - 7.8 thou/cmm 3.9   Absolute Lymphocytes  1.0 - 3.0 thou/cmm 1.8   Absolute Monocytes  0.3 - 1.0 thou/cmm 0.6   Absolute Eosinophils  0.0 - 0.5 thou/cmm 0.2   Absolute Basophils  0.0 - 0.1 thou/cmm 0.0   Neutrophils  % 58   Lymphocytes Manual  % 28   Monocytes  % 10   Eosinophils  % 3   Basophils  % 1       CMP:  Potassium   Date Value Ref Range Status   08/14/2024 3.9 3.5 - 5.2 mmol/L Final     Chloride   Date Value Ref Range Status   08/14/2024 94 (L) 100 - 109 mmol/L Final     Carbon Dioxide   Date Value Ref Range Status   08/14/2024 31 21 - 31 mmol/L Final     BUN   Date Value Ref Range Status   08/14/2024 10 7 - 25 mg/dL Final     Creatinine   Date Value Ref Range Status   08/14/2024 0.70 0.40 - 1.10 mg/dL Final     Calcium   Date Value Ref Range Status   08/14/2024 9.4 8.5 - 10.1 mg/dL Final     AST   Date Value Ref Range Status   08/14/2024 37 <41 U/L Final     ALT   Date Value Ref Range Status   08/14/2024  "41 <56 U/L Final     Alkaline Phosphatase   Date Value Ref Range Status   08/14/2024 155 (H) 35 - 120 U/L Final     GFR, Calculated   Date Value Ref Range Status   08/13/2020 94 >60 mL/min/1.73m2 Final     Comment:     mL/min per 1.73 square meters                                            Normal Function or Mild Renal    Disease (if clinically at risk):  >or=60  Moderately Decreased:                30-59  Severely Decreased:                  15-29  Renal Failure:                         <15                                            -American GFR: multiply reported GFR by 1.16    Please note that the eGFR is based on the CKD-EPI calculation, and is not intended to be used for drug dosing.                                            Note: Calculated GFR may not be an accurate indicator of renal function if the patient's renal function is not in a steady state.     eGFRcr   Date Value Ref Range Status   08/14/2024 94 >59 Final        Magnesium:   Magnesium   Date Value Ref Range Status   02/15/2021 1.8 1.4 - 2.2 mg/dL Final        A1C:  Hemoglobin A1C   Date Value Ref Range Status   08/14/2024 6.4 (H) <5.7 % Final     Comment:     Reference Range  Non-diabetic                     <5.7  Pre-diabetic                     5.7-6.4  Diabetic                         >=6.5  ADA target for diabetic control  <=7        TSH:  TSH   Date Value Ref Range Status   03/06/2023 3.88 0.45 - 5.33 uIU/mL Final     Comment:     Please note change in the reference range as of 02/22/23        PT/INR:  No results found for: \"PROTIME\", \"INR\"    Lipid Panel:  Component  Ref Range & Units 8/14/24  9:02 AM   Cholesterol  <200 mg/dL 153   Triglycerides  <150 mg/dL 208 High    Cholesterol, HDL, Direct  23 - 92 mg/dL 42   Cholesterol, Non-HDL  <160 mg/dL 111   LDL Cholesterol  <130 mg/dL 69   Comment: LDL Cholesterol was calculated using the Friedewald equation. Direct measurement of LDL is not indicated for this patient based on HNL's " "analytical algorithm for measurement of LDL Cholesterol.   Chol/HDL Ratio 3.6       Troponin:  No results found for: \"TROPONINI\"      Imaging:    EKG:    10/23/2023        JULIAN:  No results found for this or any previous visit.      Echocardiogram:    Echo 2D Complete  01/19/2022    Narrative  This result has an attachment that is not available.    Left Ventricle: Systolic function is normal with an ejection fraction  of 60-65%.    Tricuspid Valve: There is mild regurgitation.    Pulmonic Valve: The estimated pulmonary artery systolic pressure is  25.2 mmHg.      Stress Test:   Results for orders placed during the hospital encounter of 02/09/22    NM myocardial perfusion spect (rx stress and/or rest)    Interpretation Summary    Stress ECG: No ST deviation is noted. Arrhythmias during recovery: rare PVCs. The ECG was negative for ischemia. The stress ECG is negative for ischemia.    Perfusion: There are no perfusion defects.    Stress Function: Left ventricular function post-stress is normal. Post-stress ejection fraction is 81 %.    Stress Combined Conclusion: The ECG and SPECT imaging portions of the stress study are concordant with no evidence of stress induced myocardial ischemia. Left ventricular perfusion is normal.      Cardiac Catheterization:  No results found for this or any previous visit.      HOLTER MONITOR: 24 HOUR/48 HOUR MONITORS  No results found for this or any previous visit.      AMB Extended Holter Monitor: Zio XT/AT or BioTel  No results found for this or any previous visit.        DEVICE CHECK:     No results found for this or any previous visit.         Code Status: [unfilled]  Advance Directive and Living Will:      Power of :    POLST:      Counseling / Coordination of Care  Detailed discussion done about initiation of therapy for SVT, atrial fibrillation  Can use flecainide as pill in the pocket      Elliott Susan    "

## 2025-01-10 NOTE — LETTER
January 11, 2025     Taar Meyer PA-C  [address]  [address]  [facility] [address]    Patient: Maria D Palomo   YOB: 1955   Date of Visit: 1/10/2025       Dear Dr. Meyer:    Thank you for referring Maria D Palomo to me for evaluation. Below are my notes for this consultation.    If you have questions, please do not hesitate to call me. I look forward to following your patient along with you.         Sincerely,        Elliott Davis MD        CC: Maria D Palomo    Elliott Davis MD  1/11/2025  9:08 AM  Sign when Signing Visit   Consultation - Electrophysiology-Cardiology (EP)   Maria D Palomo 69 y.o. female MRN: [id number]  Unit/Bed#:  Encounter: [id number]      1. SVT (supraventricular tachycardia) (HCC)  POCT ECG      2. Paroxysmal atrial fibrillation (HCC)        3. Primary hypertension        4. PFO (patent foramen ovale)        5. Obstructive sleep apnea        6. Current use of long term anticoagulation        7. Mixed hyperlipidemia        8. Overweight                    Summary of my recommendation for the patient     F/u in a year     Palpitations-once every 2 to 3 months-less than a couple of minutes-currently on Xarelto metoprolol -negative stress test in 2022-use flecainide as a pill in the pocket-if symptoms worsen can use flecainide around-the-clock or proceed with ablation    Patient having difficult time  -  has parkinsonism and A-fib- she is primary caregiver -he may need therapy for A-fib as well as watchman-recommended to set up with Dr. Pascual or Dr. Bettencourt         Clinical Conditions  Palpitation , history of SVT  Primary Hypertension   AFib  Long-term anticoagulation, chads Vasc 3 and is on Eliquis  Mixed hyperlipidemia   PFO  Overweight  Obstructive sleep apnea          Assessment and plan     Atrial fibrillation  Long-term anticoagulation  Symptoms present for -approximately 12 months  Diagnosis is based on cardia, from prior cardiologist Dr Kelsey HOLLINGSWORTH  do not have access to the strips    Patient occasionally gets palpitation   Her friend who is a doctor has reviewed her strips and said it was PACs  Usually happens once every 2 to 3 months    Risk factors   Age-67  Obesity-BMI 28  Obstructive sleep apnea -not been tested  Thyroid disorder -check TSH  Hypertension -present, on medication  Heart failure -systolic function retained  Diabetes mellitus -check hemoglobin A1c  Tobacco use-never smoker  Alcohol use-rare social  Energy drink use-none    Current therapy   Anticoagulation-chads Vasc score-3-66-year-old female with hypertension-on Xarelto  Rate control-starting metoprolol succinate 25 mg 2 times daily  Rhythm control-flecainide as pill in the pocket    My recommendation for this patient  Palpitations-once every 2 to 3 months-less than a couple of minutes-currently on Xarelto metoprolol -negative stress test in 2022-use flecainide as a pill in the pocket-if symptoms worsen can use flecainide around-the-clock or proceed with ablation    Patient having difficult time  -  has parkinsonism and A-fib- she is primary caregiver -he may need therapy for A-fib as well as watchman-recommended to set up with Dr. Pascual or Dr. Rut MEJIA   2016, resulted in ER visit, Terminated by Valsalva maneuver  Was well controlled on diltiazem - medication was discontinued as it was too expensive  Recurrence after the same  Patient is being started on metoprolol 25 mg 2 times daily      Obstructive sleep apnea   Occasional snoring, rare morning fatigue  Close association of atrial arrhythmias with sleep apnea  Recommend home sleep study      Hypertension   Blood pressure-118/74  Medication-metoprolol succinate, lisinopril, chlorthalidone  My recommendation-metoprolol succinate is new medication, will need to review blood pressure with primary to see if control is better      Mixed hyperlipidemia  Prior records show use of atorvastatin  No myositis or myalgia    Need to confirm with patient if She is still taking the same      Patent foramen ovale  Small   Not seen on echo in 2019      Pruritus  Patient is going to follow-up with her primary  She already is on fexofenadine          History of Present Illness  HPI: Maria D Palomo is a 69 y.o. year old female has been referred to me by PCP for the evaluation of SVT and atrial fibrillation    Patient doing well currently, 2-3 brief episodes in the last 12 months    Palpitations-once every 2 to 3 months-less than a couple of minutes-currently on Xarelto + metoprolol -negative stress test in 2022-use flecainide as a pill in the pocket-if symptoms worsen can use flecainide around-the-clock or proceed with ablation    Patient having difficult time  -  has parkinsonism and A-fib- she is primary caregiver -he may need therapy for A-fib as well as watchman-recommended to set up with Dr. Pascual or Dr. Bettencourt     The patient has significant medical illnesses which include  Palpitation , history of SVT  Primary Hypertension   AFib  Long-term anticoagulation, chads Vasc 3 and is on Eliquis  Mixed hyperlipidemia   PFO  Overweight  Obstructive sleep apnea    Notes from prior cardiologist, Dr. Cole was reviewed  A) As far as atrial fibrillation is concerned, the patient follows with a Kardia   June 2021-AFib  January 2022-AFib  B) As far as SVT is concerned  2016, Patient was in the ED with rapid heart rate in the 240s, terminated with vagal maneuver  She was started on diltiazem with good control of symptoms  Diltiazem was too expensive and patient was switched to amlodipine in August 2020  She was having more frequent palpitations on amlodipine      As far as cardiac symptoms are concerned  Angina -occasional chest pain and pressure on exertion  Orthopnea -negative  Paroxysmal nocturnal dyspnea -negative  Leg swelling-some swelling while on amlodipine  Palpitations -present  Presyncope-rare  Syncope -negative  Orthostatic  lightheadedness -occasional  Exertional intolerance-rare      Snoring-unsure  morning fatigue-occasional  Daytime sleepiness-rare      Social history  Tobacco use-never smoker  Alcohol use-rare social  Energy drink use-never used  Recreational drug use-never used      Family history  Hypertension    Historical Information  No past medical history on file.  No past surgical history on file.  Social History     Substance and Sexual Activity   Alcohol Use Not Currently     Social History     Substance and Sexual Activity   Drug Use Not Currently     Social History     Tobacco Use   Smoking Status Never   Smokeless Tobacco Never     Social History     Socioeconomic History   • Marital status: /Civil Union     Spouse name: Not on file   • Number of children: Not on file   • Years of education: Not on file   • Highest education level: Not on file   Occupational History   • Not on file   Tobacco Use   • Smoking status: Never   • Smokeless tobacco: Never   Vaping Use   • Vaping status: Never Used   Substance and Sexual Activity   • Alcohol use: Not Currently   • Drug use: Not Currently   • Sexual activity: Not on file   Other Topics Concern   • Not on file   Social History Narrative   • Not on file     Social Drivers of Health     Financial Resource Strain: Medium Risk (7/4/2024)    Received from [facility]    Overall Financial Resource Strain (CARDIA)    • Difficulty of Paying Living Expenses: Somewhat hard   Food Insecurity: No Food Insecurity (7/4/2024)    Received from [facility]    Hunger Vital Sign    • Worried About Running Out of Food in the Last Year: Never true    • Ran Out of Food in the Last Year: Never true   Transportation Needs: No Transportation Needs (7/4/2024)    Received from [facility]    PRAPARE - Transportation    • Lack of Transportation (Medical): No    • Lack of Transportation (Non-Medical): No   Physical Activity: Not on file   Stress:  Stress Concern Present (7/4/2024)    Received from [facility]    [employer] - Occupational Stress Questionnaire    • Feeling of Stress : To some extent   Social Connections: Feeling Socially Integrated (7/4/2024)    Received from [facility]    OASIS : Social Isolation    • How often do you feel lonely or isolated from those around you?: Rarely   Intimate Partner Violence: Not At Risk (7/4/2024)    Received from [facility]    Humiliation, Afraid, Rape, and Kick questionnaire    • Fear of Current or Ex-Partner: No    • Emotionally Abused: No    • Physically Abused: No    • Sexually Abused: No   Housing Stability: Unknown (7/4/2024)    Received from [facility]    Housing Stability Vital Sign    • Unable to Pay for Housing in the Last Year: Not on file    • Number of Times Moved in the Last Year: 0    • Homeless in the Last Year: No     .  Family History:  Family History   Problem Relation Age of Onset   • Sleep apnea Neg Hx    • Restless legs syndrome Neg Hx          Meds/Allergies     No current facility-administered medications for this visit.       Not in a hospital admission.      Allergies   Allergen Reactions   • Peanut Oil - Food Allergy GI Intolerance, Rash and Anaphylaxis     flushed  flushed           Objective  Vitals:   Visit Vitals  /74 (BP Location: Right arm, Patient Position: Sitting, Cuff Size: Standard)   Pulse (!) 51   Wt 67.4 kg (148 lb 8 oz)   BMI 27.16 kg/m²   Smoking Status Never   BSA 1.68 m²      Vitals:    01/10/25 1125   Weight: 67.4 kg (148 lb 8 oz)   [unfilled]    Invasive Devices       None                     ROS  Review of Systems   All other systems reviewed and are negative.  As described in my history of present illness        PHYSICAL EXAM  Physical Exam  Vitals reviewed.   Constitutional:       General: She is not in acute distress.     Appearance: Normal appearance. She  is not ill-appearing.   HENT:      Head: Normocephalic and atraumatic.      Right Ear: External ear normal.      Left Ear: External ear normal.      Nose: Nose normal.   Eyes:      General: No scleral icterus.     Extraocular Movements: Extraocular movements intact.      Conjunctiva/sclera: Conjunctivae normal.      Pupils: Pupils are equal, round, and reactive to light.   Cardiovascular:      Rate and Rhythm: Normal rate and regular rhythm.      Pulses: Normal pulses.      Heart sounds: Normal heart sounds. No murmur heard.  Pulmonary:      Effort: Pulmonary effort is normal. No respiratory distress.      Breath sounds: Normal breath sounds. No wheezing.   Abdominal:      General: Bowel sounds are normal. There is no distension.      Tenderness: There is no abdominal tenderness.   Musculoskeletal:         General: No swelling, tenderness or deformity.      Cervical back: No rigidity.   Skin:     Coloration: Skin is not jaundiced.      Findings: No bruising.   Neurological:      Mental Status: She is alert. Mental status is at baseline.      Motor: No weakness.   Psychiatric:         Mood and Affect: Mood normal.         Thought Content: Thought content normal.         LAB RESULTS:    CBC:  Component  Ref Range & Units 10/25/23  8:17 AM   Hemoglobin  11.5 - 14.5 g/dL 12.4   Hematocrit  35.0 - 43.0 % 35.9   WBC  4.0 - 10.0 thou/cmm 6.5   RBC  3.70 - 4.70 mill/cmm 4.00   Platelet  140 - 350 thou/cmm 253   MPV  7.5 - 11.3 fL 8.9   MCV  80 - 100 fL 90   MCH  26.0 - 34.0 pg 31.0   MCHC  32.0 - 37.0 g/dL 34.6   RDW  12.0 - 16.0 % 13.3   Differential Type AUTO   Absolute Neutrophils  1.8 - 7.8 thou/cmm 3.9   Absolute Lymphocytes  1.0 - 3.0 thou/cmm 1.8   Absolute Monocytes  0.3 - 1.0 thou/cmm 0.6   Absolute Eosinophils  0.0 - 0.5 thou/cmm 0.2   Absolute Basophils  0.0 - 0.1 thou/cmm 0.0   Neutrophils  % 58   Lymphocytes Manual  % 28   Monocytes  % 10   Eosinophils  % 3   Basophils  % 1       CMP:  Potassium   Date Value  Ref Range Status   08/14/2024 3.9 3.5 - 5.2 mmol/L Final     Chloride   Date Value Ref Range Status   08/14/2024 94 (L) 100 - 109 mmol/L Final     Carbon Dioxide   Date Value Ref Range Status   08/14/2024 31 21 - 31 mmol/L Final     BUN   Date Value Ref Range Status   08/14/2024 10 7 - 25 mg/dL Final     Creatinine   Date Value Ref Range Status   08/14/2024 0.70 0.40 - 1.10 mg/dL Final     Calcium   Date Value Ref Range Status   08/14/2024 9.4 8.5 - 10.1 mg/dL Final     AST   Date Value Ref Range Status   08/14/2024 37 <41 U/L Final     ALT   Date Value Ref Range Status   08/14/2024 41 <56 U/L Final     Alkaline Phosphatase   Date Value Ref Range Status   08/14/2024 155 (H) 35 - 120 U/L Final     GFR, Calculated   Date Value Ref Range Status   08/13/2020 94 >60 mL/min/1.73m2 Final     Comment:     mL/min per 1.73 square meters                                            Normal Function or Mild Renal    Disease (if clinically at risk):  >or=60  Moderately Decreased:                30-59  Severely Decreased:                  15-29  Renal Failure:                         <15                                            -American GFR: multiply reported GFR by 1.16    Please note that the eGFR is based on the CKD-EPI calculation, and is not intended to be used for drug dosing.                                            Note: Calculated GFR may not be an accurate indicator of renal function if the patient's renal function is not in a steady state.     eGFRcr   Date Value Ref Range Status   08/14/2024 94 >59 Final        Magnesium:   Magnesium   Date Value Ref Range Status   02/15/2021 1.8 1.4 - 2.2 mg/dL Final        A1C:  Hemoglobin A1C   Date Value Ref Range Status   08/14/2024 6.4 (H) <5.7 % Final     Comment:     Reference Range  Non-diabetic                     <5.7  Pre-diabetic                     5.7-6.4  Diabetic                         >=6.5  ADA target for diabetic control  <=7        TSH:  TSH   Date Value  "Ref Range Status   03/06/2023 3.88 0.45 - 5.33 uIU/mL Final     Comment:     Please note change in the reference range as of 02/22/23        PT/INR:  No results found for: \"PROTIME\", \"INR\"    Lipid Panel:  Component  Ref Range & Units 8/14/24  9:02 AM   Cholesterol  <200 mg/dL 153   Triglycerides  <150 mg/dL 208 High    Cholesterol, HDL, Direct  23 - 92 mg/dL 42   Cholesterol, Non-HDL  <160 mg/dL 111   LDL Cholesterol  <130 mg/dL 69   Comment: LDL Cholesterol was calculated using the Friedewald equation. Direct measurement of LDL is not indicated for this patient based on [facility]'s analytical algorithm for measurement of LDL Cholesterol.   Chol/HDL Ratio 3.6       Troponin:  No results found for: \"TROPONINI\"      Imaging:    EKG:    10/23/2023        JULIAN:  No results found for this or any previous visit.      Echocardiogram:    Echo 2D Complete  01/19/2022    Narrative  This result has an attachment that is not available.  •  Left Ventricle: Systolic function is normal with an ejection fraction  of 60-65%.  •  Tricuspid Valve: There is mild regurgitation.  •  Pulmonic Valve: The estimated pulmonary artery systolic pressure is  25.2 mmHg.      Stress Test:   Results for orders placed during the hospital encounter of 02/09/22    NM myocardial perfusion spect (rx stress and/or rest)    Interpretation Summary  •  Stress ECG: No ST deviation is noted. Arrhythmias during recovery: rare PVCs. The ECG was negative for ischemia. The stress ECG is negative for ischemia.  •  Perfusion: There are no perfusion defects.  •  Stress Function: Left ventricular function post-stress is normal. Post-stress ejection fraction is 81 %.  •  Stress Combined Conclusion: The ECG and SPECT imaging portions of the stress study are concordant with no evidence of stress induced myocardial ischemia. Left ventricular perfusion is normal.      Cardiac Catheterization:  No results found for this or any previous visit.      HOLTER MONITOR: 24 HOUR/48 HOUR " MONITORS  No results found for this or any previous visit.      AMB Extended Holter Monitor: Zio XT/AT or BioTel  No results found for this or any previous visit.        DEVICE CHECK:     No results found for this or any previous visit.         Code Status: [unfilled]  Advance Directive and Living Will:      Power of :    POLST:      Counseling / Coordination of Care  Detailed discussion done about initiation of therapy for SVT, atrial fibrillation  Can use flecainide as pill in the pocket      Elliott Davis

## 2025-01-10 NOTE — PATIENT INSTRUCTIONS
Needs to follow up with primary with regards to pruritus     continue Xarelto 15 mg once daily + Continue metoprolol 25 mg 2 times daily

## 2025-02-27 ENCOUNTER — TELEPHONE (OUTPATIENT)
Dept: SLEEP CENTER | Facility: CLINIC | Age: 70
End: 2025-02-27

## 2025-02-27 NOTE — TELEPHONE ENCOUNTER
Per [employer] message:    2/24/25 12:42 PM  I changed insurance and [facility] is not in their network.  Can you please send rx to [facility]?  -----------------------------------------------    Rx for PAP supplies sent to [facility] via [place].

## 2025-03-03 LAB
DME PARACHUTE DELIVERY DATE REQUESTED: NORMAL
DME PARACHUTE ITEM DESCRIPTION: NORMAL
DME PARACHUTE ORDER STATUS: NORMAL
DME PARACHUTE SUPPLIER NAME: NORMAL
DME PARACHUTE SUPPLIER PHONE: NORMAL

## 2025-06-25 ENCOUNTER — TELEPHONE (OUTPATIENT)
Dept: CARDIOLOGY CLINIC | Facility: CLINIC | Age: 70
End: 2025-06-25

## 2025-06-25 ENCOUNTER — PATIENT MESSAGE (OUTPATIENT)
Dept: CARDIOLOGY CLINIC | Facility: CLINIC | Age: 70
End: 2025-06-25

## 2025-06-25 NOTE — TELEPHONE ENCOUNTER
Patient called, C/o frequent A-fib episodes, ever since her passing of her , she is under a lot of stress, She was wondering how long she needs to wait into her A-fib episode before taking the flecainide. She also states that her BP and Pulse have been running on the lower side, patient is not home and does not have any access to bp and p reader.

## 2025-06-26 DIAGNOSIS — I48.0 PAROXYSMAL ATRIAL FIBRILLATION (HCC): ICD-10-CM

## 2025-06-26 RX ORDER — FLECAINIDE ACETATE 100 MG/1
100 TABLET ORAL AS NEEDED
Qty: 30 TABLET | Refills: 2 | Status: SHIPPED | OUTPATIENT
Start: 2025-06-26

## 2025-07-07 ENCOUNTER — TELEPHONE (OUTPATIENT)
Dept: NON INVASIVE DIAGNOSTICS | Facility: CLINIC | Age: 70
End: 2025-07-07

## 2025-07-07 NOTE — PATIENT COMMUNICATION
Received call from pt regarding Flecainide to clarify instructions. Prescription is written as: Take 1 tablet (100 mg total) by mouth as needed (Take 1 tablet for palpitations lasting longer than 15 min, Take another tablet if palpitations continue 30 min after initial tablet) 150 mg once has palpitation , repeat 150 mg in 30 minutes if still continuing-total of 300 mg in 1 day     I advised her of Dr. Davis's instructions- Flecainide 100 mg tablets; 1 tablet if having palpitations for more than 15 minutes  Can repeat another tablet 100 mg, 30 minutes after the original tablet.    She verbalized understanding.  She did have to take Flecainide yesterday evening.  Palpitations resolved after 2 tablets.  She wants to know what to do if the palpitations do not stop after second dose.  She also wants to know what to do if palpitations returning in less than 24 hours.    Please advise.

## 2025-07-07 NOTE — TELEPHONE ENCOUNTER
Patient called and I shared Dr. Davis's recommendations regarding Flecainide use. She verbalized understanding.

## 2025-07-11 ENCOUNTER — OFFICE VISIT (OUTPATIENT)
Dept: CARDIOLOGY CLINIC | Facility: CLINIC | Age: 70
End: 2025-07-11
Payer: COMMERCIAL

## 2025-07-11 VITALS
DIASTOLIC BLOOD PRESSURE: 82 MMHG | HEART RATE: 47 BPM | BODY MASS INDEX: 26.39 KG/M2 | SYSTOLIC BLOOD PRESSURE: 128 MMHG | WEIGHT: 143.4 LBS | HEIGHT: 62 IN

## 2025-07-11 DIAGNOSIS — I48.0 PAROXYSMAL ATRIAL FIBRILLATION (HCC): ICD-10-CM

## 2025-07-11 DIAGNOSIS — Z79.01 CURRENT USE OF LONG TERM ANTICOAGULATION: ICD-10-CM

## 2025-07-11 DIAGNOSIS — Q21.12 PFO (PATENT FORAMEN OVALE): ICD-10-CM

## 2025-07-11 DIAGNOSIS — I10 PRIMARY HYPERTENSION: ICD-10-CM

## 2025-07-11 DIAGNOSIS — E78.2 MIXED HYPERLIPIDEMIA: ICD-10-CM

## 2025-07-11 DIAGNOSIS — G47.33 OBSTRUCTIVE SLEEP APNEA: ICD-10-CM

## 2025-07-11 DIAGNOSIS — E66.3 OVERWEIGHT: ICD-10-CM

## 2025-07-11 DIAGNOSIS — I47.10 SVT (SUPRAVENTRICULAR TACHYCARDIA) (HCC): Primary | ICD-10-CM

## 2025-07-11 PROCEDURE — 99214 OFFICE O/P EST MOD 30 MIN: CPT | Performed by: INTERNAL MEDICINE

## 2025-07-11 RX ORDER — FLECAINIDE ACETATE 100 MG/1
100 TABLET ORAL AS NEEDED
Qty: 90 TABLET | Refills: 2 | Status: SHIPPED | OUTPATIENT
Start: 2025-07-11

## 2025-07-11 NOTE — LETTER
2025     Tara Meyer PA-C  [address] [facility]  [address]  [facility] [address]    Patient: Maria D Palomo   YOB: 1955   Date of Visit: 2025       Dear Dr. Tara Meyer PA-C  Maria D SAMPSON Stacia:    Thank you for referring Maria D Palomo to me for evaluation. Below are my notes for this consultation.    If you have questions, please do not hesitate to call me. I look forward to following your patient along with you.         Sincerely,        Elliott Davis MD        CC: Maria D DE LA TORREHardeep Stacia Davis MD  2025  1:08 PM  Sign when Signing Visit   Consultation - Electrophysiology-Cardiology (EP)   Maria D Palomo 69 y.o. female MRN: [id number]  Unit/Bed#:  Encounter: [id number]      1. SVT (supraventricular tachycardia) (HCC)        2. Paroxysmal atrial fibrillation (HCC)        3. Primary hypertension        4. PFO (patent foramen ovale)        5. Obstructive sleep apnea        6. Current use of long term anticoagulation        7. Mixed hyperlipidemia        8. Overweight                      Summary of my recommendation for the patient   Patient has been under a lot of stress  Her  recently  -  had a fall, head bleed, was in trauma ICU for 15 days  Patient had 5 episodes of A-fib -records are in her iPhone and I confirmed  it myself-confirmed that it is A-fib  She has used the flecainide 2 times    Currently she is grieving and is under significant stress  Continue with flecainide 100 mg twice daily around-the-clock for at least 3 months  Thereafter can go back to flecainide use as needed    If patient is having breakthrough despite being on flecainide and plan for PFA ablation    Follow-up with me in 6 months    Palpitations-once every 2 to 3 months-less than a couple of minutes-currently on Xarelto metoprolol -negative stress test in -use flecainide as a pill in the pocket-if symptoms worsen can use flecainide around-the-clock or proceed with  ablation        Clinical Conditions  Palpitation , history of SVT  Primary Hypertension   AFib  Long-term anticoagulation, chads Vasc 3 and is on Eliquis  Mixed hyperlipidemia   PFO  Overweight  Obstructive sleep apnea          Assessment and plan     Atrial fibrillation  Long-term anticoagulation  Symptoms present for -approximately 12 months  Diagnosis is based on cardia, from prior cardiologist Dr Cole  I reviewed recent strips which confirm AF    Risk factors   Age-69  Obesity-BMI 27  Obstructive sleep apnea -not been tested  Thyroid disorder -check TSH  Hypertension -present, on medication  Heart failure -systolic function retained  Diabetes mellitus -check hemoglobin A1c  Tobacco use-never smoker  Alcohol use-rare social  Energy drink use-none    Current therapy   Anticoagulation-chads Vasc score-3-66-year-old female with hypertension-on Xarelto  Rate control-starting metoprolol succinate 25 mg 2 times daily  Rhythm control-flecainide as pill in the pocket    My recommendation for this patient  Patient has been under a lot of stress  Her  recently  -  had a fall, head bleed, was in trauma ICU for 15 days  Patient had 5 episodes of A-fib -records are in her iPhone and I confirmed  it myself-confirmed that it is A-fib  She has used the flecainide 2 times    Currently she is grieving and is under significant stress  Continue with flecainide 100 mg twice daily around-the-clock for at least 3 months  Thereafter can go back to flecainide use as needed    If patient is having breakthrough despite being on flecainide and plan for PFA ablation    Follow-up with me in 6 months    Palpitations-once every 2 to 3 months-less than a couple of minutes-currently on Xarelto metoprolol -negative stress test in -use flecainide as a pill in the pocket-if symptoms worsen can use flecainide around-the-clock or proceed with ablation              SVT   2016, resulted in ER visit, Terminated by Valsalva maneuver  Was  well controlled on diltiazem - medication was discontinued as it was too expensive  Recurrence after the same  Patient is being started on metoprolol 25 mg 2 times daily      Obstructive sleep apnea   Occasional snoring, rare morning fatigue  Close association of atrial arrhythmias with sleep apnea  Recommend home sleep study      Hypertension   Blood pressure-128/82  Medication-metoprolol succinate, lisinopril, chlorthalidone  My recommendation-metoprolol succinate is new medication, will need to review blood pressure with primary to see if control is better      Mixed hyperlipidemia  Prior records show use of atorvastatin  No myositis or myalgia   Need to confirm with patient if She is still taking the same      Patent foramen ovale  Small   Not seen on echo in 2019      Pruritus  Patient is going to follow-up with her primary  She already is on fexofenadine          History of Present Illness   HPI: Maria D Palomo is a 69 y.o. year old female has been referred to me by PCP for the evaluation of SVT and atrial fibrillation    Patient has been under a lot of stress  Her  recently  -  had a fall, head bleed, was in trauma ICU for 15 days  Patient had 5 episodes of A-fib -records are in her iPhone and I confirmed  it myself-confirmed that it is A-fib  She has used the flecainide 2 times    Currently she is grieving and is under significant stress    The patient has significant medical illnesses which include  Palpitation , history of SVT  Primary Hypertension   AFib  Long-term anticoagulation, chads Vasc 3 and is on Eliquis  Mixed hyperlipidemia   PFO  Overweight  Obstructive sleep apnea    Notes from prior cardiologist, Dr. Cole was reviewed  A) As far as atrial fibrillation is concerned, the patient follows with a Kardia   2021-AFib  2022-AFib  B) As far as SVT is concerned  , Patient was in the ED with rapid heart rate in the 240s, terminated with vagal maneuver  She was started on  diltiazem with good control of symptoms  Diltiazem was too expensive and patient was switched to amlodipine in August 2020  She was having more frequent palpitations on amlodipine      As far as cardiac symptoms are concerned  Angina -occasional chest pain and pressure on exertion  Orthopnea -negative  Paroxysmal nocturnal dyspnea -negative  Leg swelling-some swelling while on amlodipine  Palpitations -present  Presyncope-rare  Syncope -negative  Orthostatic lightheadedness -occasional  Exertional intolerance-rare      Snoring-unsure  morning fatigue-occasional  Daytime sleepiness-rare      Social history  Tobacco use-never smoker  Alcohol use-rare social  Energy drink use-never used  Recreational drug use-never used      Family history  Hypertension    Historical Information   No past medical history on file.  No past surgical history on file.  Social History     Substance and Sexual Activity   Alcohol Use Not Currently     Social History     Substance and Sexual Activity   Drug Use Not Currently     Social History     Tobacco Use   Smoking Status Never   Smokeless Tobacco Never     Social History     Socioeconomic History   • Marital status: /Civil Union     Spouse name: Not on file   • Number of children: Not on file   • Years of education: Not on file   • Highest education level: Not on file   Occupational History   • Not on file   Tobacco Use   • Smoking status: Never   • Smokeless tobacco: Never   Vaping Use   • Vaping status: Never Used   Substance and Sexual Activity   • Alcohol use: Not Currently   • Drug use: Not Currently   • Sexual activity: Not on file   Other Topics Concern   • Not on file   Social History Narrative   • Not on file     Social Drivers of Health     Financial Resource Strain: Not At Risk (2/24/2025)    Received from [facility]    Financial Insecurity    • In the last 12 months did you skip medications to save money?: No    • In the last 12 months was there a time  when you needed to see a doctor but could not because of cost?: No   Food Insecurity: No Food Insecurity (2/24/2025)    Received from [facility]    Food Insecurity    • In the last 12 months did you ever eat less than you felt you should because there wasn't enough money for food?: No   Transportation Needs: No Transportation Needs (2/24/2025)    Received from [facility]    Transportation Needs    • In the last 12 months have you ever had to go without healthcare because you didn't have a way to get there?: No   Physical Activity: Not on file   Stress: Stress Concern Present (7/4/2024)    Received from [facility]    [employer] [place] [place] [employer] - Occupational Stress Questionnaire    • Feeling of Stress : To some extent   Social Connections: Socially Integrated (2/24/2025)    Received from [facility]    Social Connection    • Do you often feel lonely?: No   Intimate Partner Violence: Not At Risk (7/4/2024)    Received from [facility]    Humiliation, Afraid, Rape, and Kick questionnaire    • Within the last year, have you been afraid of your partner or ex-partner?: No    • Within the last year, have you been humiliated or emotionally abused in other ways by your partner or ex-partner?: No    • Within the last year, have you been kicked, hit, slapped, or otherwise physically hurt by your partner or ex-partner?: No    • Within the last year, have you been raped or forced to have any kind of sexual activity by your partner or ex-partner?: No   Housing Stability: Not At Risk (2/24/2025)    Received from [facility]    Housing Stability    • Are you worried that in the next 2 months you may not have stable housing?: No     .  Family History:  Family History   Problem Relation Name Age of Onset   • Sleep apnea Neg Hx     • Restless legs syndrome Neg Hx           Meds/Allergies      No current  facility-administered medications for this visit.       Not in a hospital admission.      Allergies   Allergen Reactions   • Peanut Oil - Food Allergy GI Intolerance, Rash and Anaphylaxis     flushed  flushed           Objective   Vitals:   Visit Vitals  Smoking Status Never      There were no vitals filed for this visit.  [unfilled]    Invasive Devices       None                     ROS  Review of Systems   All other systems reviewed and are negative.  As described in my history of present illness        PHYSICAL EXAM  Physical Exam  Vitals reviewed.   Constitutional:       General: She is not in acute distress.     Appearance: Normal appearance. She is not ill-appearing.   HENT:      Head: Normocephalic and atraumatic.      Right Ear: External ear normal.      Left Ear: External ear normal.      Nose: Nose normal.     Eyes:      General: No scleral icterus.     Extraocular Movements: Extraocular movements intact.      Conjunctiva/sclera: Conjunctivae normal.      Pupils: Pupils are equal, round, and reactive to light.       Cardiovascular:      Rate and Rhythm: Normal rate and regular rhythm.      Pulses: Normal pulses.      Heart sounds: Normal heart sounds. No murmur heard.  Pulmonary:      Effort: Pulmonary effort is normal. No respiratory distress.      Breath sounds: Normal breath sounds. No wheezing.   Abdominal:      General: Bowel sounds are normal. There is no distension.      Tenderness: There is no abdominal tenderness.     Musculoskeletal:         General: No swelling, tenderness or deformity.      Cervical back: No rigidity.     Skin:     Coloration: Skin is not jaundiced.      Findings: No bruising.     Neurological:      Mental Status: She is alert. Mental status is at baseline.      Motor: No weakness.     Psychiatric:         Mood and Affect: Mood normal.         Thought Content: Thought content normal.       LAB RESULTS:    CBC:  Component  Ref Range & Units 10/25/23  8:17 AM   Hemoglobin  11.5 -  14.5 g/dL 12.4   Hematocrit  35.0 - 43.0 % 35.9   WBC  4.0 - 10.0 thou/cmm 6.5   RBC  3.70 - 4.70 mill/cmm 4.00   Platelet  140 - 350 thou/cmm 253   MPV  7.5 - 11.3 fL 8.9   MCV  80 - 100 fL 90   MCH  26.0 - 34.0 pg 31.0   MCHC  32.0 - 37.0 g/dL 34.6   RDW  12.0 - 16.0 % 13.3   Differential Type AUTO   Absolute Neutrophils  1.8 - 7.8 thou/cmm 3.9   Absolute Lymphocytes  1.0 - 3.0 thou/cmm 1.8   Absolute Monocytes  0.3 - 1.0 thou/cmm 0.6   Absolute Eosinophils  0.0 - 0.5 thou/cmm 0.2   Absolute Basophils  0.0 - 0.1 thou/cmm 0.0   Neutrophils  % 58   Lymphocytes Manual  % 28   Monocytes  % 10   Eosinophils  % 3   Basophils  % 1       CMP:  Potassium   Date Value Ref Range Status   05/30/2025 3.7 3.5 - 5.2 mmol/L Final     Chloride   Date Value Ref Range Status   05/30/2025 98 (L) 100 - 109 mmol/L Final     Carbon Dioxide   Date Value Ref Range Status   05/30/2025 30 21 - 31 mmol/L Final     BUN   Date Value Ref Range Status   05/30/2025 8 7 - 25 mg/dL Final     Creatinine   Date Value Ref Range Status   05/30/2025 0.61 0.40 - 1.10 mg/dL Final     Calcium   Date Value Ref Range Status   05/30/2025 9.2 8.5 - 10.5 mg/dL Final     AST   Date Value Ref Range Status   05/28/2025 31 <41 U/L Final     ALT   Date Value Ref Range Status   05/28/2025 29 <56 U/L Final     Alkaline Phosphatase   Date Value Ref Range Status   05/28/2025 150 (H) 35 - 120 U/L Final     GFR, Calculated   Date Value Ref Range Status   08/13/2020 94 >60 mL/min/1.73m2 Final     Comment:     mL/min per 1.73 square meters                                            Normal Function or Mild Renal    Disease (if clinically at risk):  >or=60  Moderately Decreased:                30-59  Severely Decreased:                  15-29  Renal Failure:                         <15                                            -American GFR: multiply reported GFR by 1.16    Please note that the eGFR is based on the CKD-EPI calculation, and is not intended to be used  "for drug dosing.                                            Note: Calculated GFR may not be an accurate indicator of renal function if the patient's renal function is not in a steady state.     eGFRcr   Date Value Ref Range Status   05/30/2025 97 >59 Final        Magnesium:   Magnesium   Date Value Ref Range Status   05/30/2025 1.5 1.4 - 2.2 mg/dL Final        A1C:  Hemoglobin A1C   Date Value Ref Range Status   08/14/2024 6.4 (H) <5.7 % Final     Comment:     Reference Range  Non-diabetic                     <5.7  Pre-diabetic                     5.7-6.4  Diabetic                         >=6.5  ADA target for diabetic control  <=7        TSH:  TSH   Date Value Ref Range Status   03/06/2023 3.88 0.45 - 5.33 uIU/mL Final     Comment:     Please note change in the reference range as of 02/22/23        PT/INR:  No results found for: \"PROTIME\", \"INR\"    Lipid Panel:  Component  Ref Range & Units 8/14/24  9:02 AM   Cholesterol  <200 mg/dL 153   Triglycerides  <150 mg/dL 208 High    Cholesterol, HDL, Direct  23 - 92 mg/dL 42   Cholesterol, Non-HDL  <160 mg/dL 111   LDL Cholesterol  <130 mg/dL 69   Comment: LDL Cholesterol was calculated using the Friedewald equation. Direct measurement of LDL is not indicated for this patient based on \[facility] [place] [facility]\""'s analytical algorithm for measurement of LDL Cholesterol.   Chol/HDL Ratio 3.6       Troponin:  No results found for: \"TROPONINI\"      Imaging:    EKG:    10/23/2023        JULIAN:  No results found for this or any previous visit.      Echocardiogram:    Echo 2D Complete  01/19/2022    Narrative  This result has an attachment that is not available.  •  Left Ventricle: Systolic function is normal with an ejection fraction  of 60-65%.  •  Tricuspid Valve: There is mild regurgitation.  •  Pulmonic Valve: The estimated pulmonary artery systolic pressure is  25.2 mmHg.      Stress Test:   Results for orders placed during the hospital encounter of 02/09/22    NM myocardial perfusion spect (rx " stress and/or rest)    Interpretation Summary  •  Stress ECG: No ST deviation is noted. Arrhythmias during recovery: rare PVCs. The ECG was negative for ischemia. The stress ECG is negative for ischemia.  •  Perfusion: There are no perfusion defects.  •  Stress Function: Left ventricular function post-stress is normal. Post-stress ejection fraction is 81 %.  •  Stress Combined Conclusion: The ECG and SPECT imaging portions of the stress study are concordant with no evidence of stress induced myocardial ischemia. Left ventricular perfusion is normal.      Cardiac Catheterization:  No results found for this or any previous visit.      HOLTER MONITOR: 24 HOUR/48 HOUR MONITORS  No results found for this or any previous visit.      AMB Extended Holter Monitor: Zio XT/AT or BioTel  No results found for this or any previous visit.        DEVICE CHECK:     No results found for this or any previous visit.         Code Status: [unfilled]  Advance Directive and Living Will:      Power of :    POLST:      Counseling / Coordination of Care  Detailed discussion done about initiation of therapy for SVT, atrial fibrillation  Can use flecainide 100 mg twice daily for 3 months and thereafter go back to as needed pill in the pocket      Elliott Davis

## 2025-07-11 NOTE — PROGRESS NOTES
Consultation - Electrophysiology-Cardiology (EP)   Maria D Palomo 69 y.o. female MRN: [id number]  Unit/Bed#:  Encounter: [id number]      1. SVT (supraventricular tachycardia) (HCC)        2. Paroxysmal atrial fibrillation (HCC)        3. Primary hypertension        4. PFO (patent foramen ovale)        5. Obstructive sleep apnea        6. Current use of long term anticoagulation        7. Mixed hyperlipidemia        8. Overweight                      Summary of my recommendation for the patient   Patient has been under a lot of stress  Her  recently  -  had a fall, head bleed, was in trauma ICU for 15 days  Patient had 5 episodes of A-fib -records are in her iPhone and I confirmed  it myself-confirmed that it is A-fib  She has used the flecainide 2 times    Currently she is grieving and is under significant stress  Continue with flecainide 100 mg twice daily around-the-clock for at least 3 months  Thereafter can go back to flecainide use as needed    If patient is having breakthrough despite being on flecainide and plan for PFA ablation    Follow-up with me in 6 months    Palpitations-once every 2 to 3 months-less than a couple of minutes-currently on Xarelto metoprolol -negative stress test in -use flecainide as a pill in the pocket-if symptoms worsen can use flecainide around-the-clock or proceed with ablation        Clinical Conditions  Palpitation , history of SVT  Primary Hypertension   AFib  Long-term anticoagulation, chads Vasc 3 and is on Eliquis  Mixed hyperlipidemia   PFO  Overweight  Obstructive sleep apnea          Assessment and plan     Atrial fibrillation  Long-term anticoagulation  Symptoms present for -approximately 12 months  Diagnosis is based on cardia, from prior cardiologist Dr Cole  I reviewed recent strips which confirm AF    Risk factors   Age-69  Obesity-BMI 27  Obstructive sleep apnea -not been tested  Thyroid disorder -check TSH  Hypertension -present, on medication  Heart  failure -systolic function retained  Diabetes mellitus -check hemoglobin A1c  Tobacco use-never smoker  Alcohol use-rare social  Energy drink use-none    Current therapy   Anticoagulation-chads Vasc score-3-66-year-old female with hypertension-on Xarelto  Rate control-starting metoprolol succinate 25 mg 2 times daily  Rhythm control-flecainide as pill in the pocket    My recommendation for this patient  Patient has been under a lot of stress  Her  recently  -  had a fall, head bleed, was in trauma ICU for 15 days  Patient had 5 episodes of A-fib -records are in her iPhone and I confirmed  it myself-confirmed that it is A-fib  She has used the flecainide 2 times    Currently she is grieving and is under significant stress  Continue with flecainide 100 mg twice daily around-the-clock for at least 3 months  Thereafter can go back to flecainide use as needed    If patient is having breakthrough despite being on flecainide and plan for PFA ablation    Follow-up with me in 6 months    Palpitations-once every 2 to 3 months-less than a couple of minutes-currently on Xarelto metoprolol -negative stress test in -use flecainide as a pill in the pocket-if symptoms worsen can use flecainide around-the-clock or proceed with ablation              SVT   2016, resulted in ER visit, Terminated by Valsalva maneuver  Was well controlled on diltiazem - medication was discontinued as it was too expensive  Recurrence after the same  Patient is being started on metoprolol 25 mg 2 times daily      Obstructive sleep apnea   Occasional snoring, rare morning fatigue  Close association of atrial arrhythmias with sleep apnea  Recommend home sleep study      Hypertension   Blood pressure-128/82  Medication-metoprolol succinate, lisinopril, chlorthalidone  My recommendation-metoprolol succinate is new medication, will need to review blood pressure with primary to see if control is better      Mixed hyperlipidemia  Prior records show  use of atorvastatin  No myositis or myalgia   Need to confirm with patient if She is still taking the same      Patent foramen ovale  Small   Not seen on echo in 2019      Pruritus  Patient is going to follow-up with her primary  She already is on fexofenadine          History of Present Illness   HPI: Maria D Palomo is a 69 y.o. year old female has been referred to me by PCP for the evaluation of SVT and atrial fibrillation    Patient has been under a lot of stress  Her  recently  -  had a fall, head bleed, was in trauma ICU for 15 days  Patient had 5 episodes of A-fib -records are in her iPhone and I confirmed  it myself-confirmed that it is A-fib  She has used the flecainide 2 times    Currently she is grieving and is under significant stress    The patient has significant medical illnesses which include  Palpitation , history of SVT  Primary Hypertension   AFib  Long-term anticoagulation, chads Vasc 3 and is on Eliquis  Mixed hyperlipidemia   PFO  Overweight  Obstructive sleep apnea    Notes from prior cardiologist, Dr. Cole was reviewed  A) As far as atrial fibrillation is concerned, the patient follows with a Kardia   2021-AFib  2022-AFib  B) As far as SVT is concerned  , Patient was in the ED with rapid heart rate in the 240s, terminated with vagal maneuver  She was started on diltiazem with good control of symptoms  Diltiazem was too expensive and patient was switched to amlodipine in 2020  She was having more frequent palpitations on amlodipine      As far as cardiac symptoms are concerned  Angina -occasional chest pain and pressure on exertion  Orthopnea -negative  Paroxysmal nocturnal dyspnea -negative  Leg swelling-some swelling while on amlodipine  Palpitations -present  Presyncope-rare  Syncope -negative  Orthostatic lightheadedness -occasional  Exertional intolerance-rare      Snoring-unsure  morning fatigue-occasional  Daytime sleepiness-rare      Social  history  Tobacco use-never smoker  Alcohol use-rare social  Energy drink use-never used  Recreational drug use-never used      Family history  Hypertension    Historical Information   No past medical history on file.  No past surgical history on file.  Social History     Substance and Sexual Activity   Alcohol Use Not Currently     Social History     Substance and Sexual Activity   Drug Use Not Currently     Social History     Tobacco Use   Smoking Status Never   Smokeless Tobacco Never     Social History     Socioeconomic History    Marital status: /Civil Union     Spouse name: Not on file    Number of children: Not on file    Years of education: Not on file    Highest education level: Not on file   Occupational History    Not on file   Tobacco Use    Smoking status: Never    Smokeless tobacco: Never   Vaping Use    Vaping status: Never Used   Substance and Sexual Activity    Alcohol use: Not Currently    Drug use: Not Currently    Sexual activity: Not on file   Other Topics Concern    Not on file   Social History Narrative    Not on file     Social Drivers of Health     Financial Resource Strain: Not At Risk (2/24/2025)    Received from [facility]    Financial Insecurity     In the last 12 months did you skip medications to save money?: No     In the last 12 months was there a time when you needed to see a doctor but could not because of cost?: No   Food Insecurity: No Food Insecurity (2/24/2025)    Received from [facility]    Food Insecurity     In the last 12 months did you ever eat less than you felt you should because there wasn't enough money for food?: No   Transportation Needs: No Transportation Needs (2/24/2025)    Received from [facility]    Transportation Needs     In the last 12 months have you ever had to go without healthcare because you didn't have a way to get there?: No   Physical Activity: Not on file   Stress: Stress Concern Present  (7/4/2024)    Received from [facility]    Citizen of [place] [employer] - Occupational Stress Questionnaire     Feeling of Stress : To some extent   Social Connections: Socially Integrated (2/24/2025)    Received from [facility]    Social Connection     Do you often feel lonely?: No   Intimate Partner Violence: Not At Risk (7/4/2024)    Received from [facility]    Humiliation, Afraid, Rape, and Kick questionnaire     Within the last year, have you been afraid of your partner or ex-partner?: No     Within the last year, have you been humiliated or emotionally abused in other ways by your partner or ex-partner?: No     Within the last year, have you been kicked, hit, slapped, or otherwise physically hurt by your partner or ex-partner?: No     Within the last year, have you been raped or forced to have any kind of sexual activity by your partner or ex-partner?: No   Housing Stability: Not At Risk (2/24/2025)    Received from [facility]    Housing Stability     Are you worried that in the next 2 months you may not have stable housing?: No     .  Family History:  Family History   Problem Relation Name Age of Onset    Sleep apnea Neg Hx      Restless legs syndrome Neg Hx           Meds/Allergies      No current facility-administered medications for this visit.       Not in a hospital admission.      Allergies   Allergen Reactions    Peanut Oil - Food Allergy GI Intolerance, Rash and Anaphylaxis     flushed  flushed           Objective   Vitals:   Visit Vitals  Smoking Status Never      There were no vitals filed for this visit.  [unfilled]    Invasive Devices       None                     ROS  Review of Systems   All other systems reviewed and are negative.  As described in my history of present illness        PHYSICAL EXAM  Physical Exam  Vitals reviewed.   Constitutional:       General: She is not in acute distress.     Appearance: Normal  appearance. She is not ill-appearing.   HENT:      Head: Normocephalic and atraumatic.      Right Ear: External ear normal.      Left Ear: External ear normal.      Nose: Nose normal.     Eyes:      General: No scleral icterus.     Extraocular Movements: Extraocular movements intact.      Conjunctiva/sclera: Conjunctivae normal.      Pupils: Pupils are equal, round, and reactive to light.       Cardiovascular:      Rate and Rhythm: Normal rate and regular rhythm.      Pulses: Normal pulses.      Heart sounds: Normal heart sounds. No murmur heard.  Pulmonary:      Effort: Pulmonary effort is normal. No respiratory distress.      Breath sounds: Normal breath sounds. No wheezing.   Abdominal:      General: Bowel sounds are normal. There is no distension.      Tenderness: There is no abdominal tenderness.     Musculoskeletal:         General: No swelling, tenderness or deformity.      Cervical back: No rigidity.     Skin:     Coloration: Skin is not jaundiced.      Findings: No bruising.     Neurological:      Mental Status: She is alert. Mental status is at baseline.      Motor: No weakness.     Psychiatric:         Mood and Affect: Mood normal.         Thought Content: Thought content normal.       LAB RESULTS:    CBC:  Component  Ref Range & Units 10/25/23  8:17 AM   Hemoglobin  11.5 - 14.5 g/dL 12.4   Hematocrit  35.0 - 43.0 % 35.9   WBC  4.0 - 10.0 thou/cmm 6.5   RBC  3.70 - 4.70 mill/cmm 4.00   Platelet  140 - 350 thou/cmm 253   MPV  7.5 - 11.3 fL 8.9   MCV  80 - 100 fL 90   MCH  26.0 - 34.0 pg 31.0   MCHC  32.0 - 37.0 g/dL 34.6   RDW  12.0 - 16.0 % 13.3   Differential Type AUTO   Absolute Neutrophils  1.8 - 7.8 thou/cmm 3.9   Absolute Lymphocytes  1.0 - 3.0 thou/cmm 1.8   Absolute Monocytes  0.3 - 1.0 thou/cmm 0.6   Absolute Eosinophils  0.0 - 0.5 thou/cmm 0.2   Absolute Basophils  0.0 - 0.1 thou/cmm 0.0   Neutrophils  % 58   Lymphocytes Manual  % 28   Monocytes  % 10   Eosinophils  % 3   Basophils  % 1        CMP:  Potassium   Date Value Ref Range Status   05/30/2025 3.7 3.5 - 5.2 mmol/L Final     Chloride   Date Value Ref Range Status   05/30/2025 98 (L) 100 - 109 mmol/L Final     Carbon Dioxide   Date Value Ref Range Status   05/30/2025 30 21 - 31 mmol/L Final     BUN   Date Value Ref Range Status   05/30/2025 8 7 - 25 mg/dL Final     Creatinine   Date Value Ref Range Status   05/30/2025 0.61 0.40 - 1.10 mg/dL Final     Calcium   Date Value Ref Range Status   05/30/2025 9.2 8.5 - 10.5 mg/dL Final     AST   Date Value Ref Range Status   05/28/2025 31 <41 U/L Final     ALT   Date Value Ref Range Status   05/28/2025 29 <56 U/L Final     Alkaline Phosphatase   Date Value Ref Range Status   05/28/2025 150 (H) 35 - 120 U/L Final     GFR, Calculated   Date Value Ref Range Status   08/13/2020 94 >60 mL/min/1.73m2 Final     Comment:     mL/min per 1.73 square meters                                            Normal Function or Mild Renal    Disease (if clinically at risk):  >or=60  Moderately Decreased:                30-59  Severely Decreased:                  15-29  Renal Failure:                         <15                                            -American GFR: multiply reported GFR by 1.16    Please note that the eGFR is based on the CKD-EPI calculation, and is not intended to be used for drug dosing.                                            Note: Calculated GFR may not be an accurate indicator of renal function if the patient's renal function is not in a steady state.     eGFRcr   Date Value Ref Range Status   05/30/2025 97 >59 Final        Magnesium:   Magnesium   Date Value Ref Range Status   05/30/2025 1.5 1.4 - 2.2 mg/dL Final        A1C:  Hemoglobin A1C   Date Value Ref Range Status   08/14/2024 6.4 (H) <5.7 % Final     Comment:     Reference Range  Non-diabetic                     <5.7  Pre-diabetic                     5.7-6.4  Diabetic                         >=6.5  ADA target for diabetic control   "<=7        TSH:  TSH   Date Value Ref Range Status   03/06/2023 3.88 0.45 - 5.33 uIU/mL Final     Comment:     Please note change in the reference range as of 02/22/23        PT/INR:  No results found for: \"PROTIME\", \"INR\"    Lipid Panel:  Component  Ref Range & Units 8/14/24  9:02 AM   Cholesterol  <200 mg/dL 153   Triglycerides  <150 mg/dL 208 High    Cholesterol, HDL, Direct  23 - 92 mg/dL 42   Cholesterol, Non-HDL  <160 mg/dL 111   LDL Cholesterol  <130 mg/dL 69   Comment: LDL Cholesterol was calculated using the Friedewald equation. Direct measurement of LDL is not indicated for this patient based on [facility]'s analytical algorithm for measurement of LDL Cholesterol.   Chol/HDL Ratio 3.6       Troponin:  No results found for: \"TROPONINI\"      Imaging:    EKG:    10/23/2023        JULIAN:  No results found for this or any previous visit.      Echocardiogram:    Echo 2D Complete  01/19/2022    Narrative  This result has an attachment that is not available.    Left Ventricle: Systolic function is normal with an ejection fraction  of 60-65%.    Tricuspid Valve: There is mild regurgitation.    Pulmonic Valve: The estimated pulmonary artery systolic pressure is  25.2 mmHg.      Stress Test:   Results for orders placed during the hospital encounter of 02/09/22    NM myocardial perfusion spect (rx stress and/or rest)    Interpretation Summary    Stress ECG: No ST deviation is noted. Arrhythmias during recovery: rare PVCs. The ECG was negative for ischemia. The stress ECG is negative for ischemia.    Perfusion: There are no perfusion defects.    Stress Function: Left ventricular function post-stress is normal. Post-stress ejection fraction is 81 %.    Stress Combined Conclusion: The ECG and SPECT imaging portions of the stress study are concordant with no evidence of stress induced myocardial ischemia. Left ventricular perfusion is normal.      Cardiac Catheterization:  No results found for this or any previous " visit.      HOLTER MONITOR: 24 HOUR/48 HOUR MONITORS  No results found for this or any previous visit.      AMB Extended Holter Monitor: Zio XT/AT or BioTel  No results found for this or any previous visit.        DEVICE CHECK:     No results found for this or any previous visit.         Code Status: [unfilled]  Advance Directive and Living Will:      Power of :    POLST:      Counseling / Coordination of Care  Detailed discussion done about initiation of therapy for SVT, atrial fibrillation  Can use flecainide 100 mg twice daily for 3 months and thereafter go back to as needed pill in the pocket      Elliott Davis

## 2025-07-11 NOTE — TELEPHONE ENCOUNTER
Medication: flecainide    Dose/Frequency: as needed    Quantity: 90    Ph[facility]    Office:   [] PCP/Provider -   [x] Speciality/Provider -     Does the patient have enough for 3 days?   [x] Yes   [] No - Send as HP to POD

## 2025-07-15 ENCOUNTER — TELEPHONE (OUTPATIENT)
Dept: NON INVASIVE DIAGNOSTICS | Facility: CLINIC | Age: 70
End: 2025-07-15

## 2025-07-15 ENCOUNTER — NURSE TRIAGE (OUTPATIENT)
Dept: CARDIOLOGY CLINIC | Facility: CLINIC | Age: 70
End: 2025-07-15

## 2025-07-17 NOTE — TELEPHONE ENCOUNTER
Patient reports feeling better on the Flec 50 BID and palpiations are undercontrol.  She is questioning if it is ok that her HR is at 33 at HS. She says she got an apple watch and that is what it is reporting at bedtime. I informed her that I would get back to her if the team has any concerns or recommendations.   She is on Metoprolol 25 mg BID

## 2025-07-17 NOTE — TELEPHONE ENCOUNTER
LVM for patient informing her to cut meoprolol down to 12.5 mg bid and continue to monitor hr. Left number to office to confirm she got the message.

## 2025-07-21 ENCOUNTER — TELEPHONE (OUTPATIENT)
Dept: CARDIOLOGY CLINIC | Facility: CLINIC | Age: 70
End: 2025-07-21

## 2025-07-21 DIAGNOSIS — I10 PRIMARY HYPERTENSION: ICD-10-CM

## 2025-07-21 DIAGNOSIS — I48.0 PAROXYSMAL ATRIAL FIBRILLATION (HCC): Primary | ICD-10-CM

## 2025-07-21 DIAGNOSIS — I47.10 SVT (SUPRAVENTRICULAR TACHYCARDIA) (HCC): ICD-10-CM

## 2025-07-21 NOTE — TELEPHONE ENCOUNTER
Patient called to inform us that she a 2-3 hour episode of A-fib Friday night, she increased her flecainide dose back to 100 mg twice a day, she's been without A-fib since, just some noticeable PVC's this morning, Patient just wanted to update us that she went back on the 100 mg twice a day dose.

## 2025-07-21 NOTE — TELEPHONE ENCOUNTER
Pt called back to state she's out of afib and in SR    BP: 112/85  HR: 80 bpm    She would like to know what she should do if she goes back in afib. Pt is aware she can take her Flecainide up to 300 mg a day and if symptoms worsen to report to the ER.

## 2025-07-21 NOTE — TELEPHONE ENCOUNTER
Patient called to let us know that it feels like she is back in Afib.    Her apple watch is saying Afib with 133 HR and her /107 (not sure if accurate it was giving her an error message)    Patient only complains of feeling palpitations denies CP, SOB, lightheadedness, dizzy.    Patient increased flecainide to 100mg BID. She is also taking metoprolol 12.5mg BID. And Xarelto 15mg QD.     I advised I would reach out to a provider for further recommendations and if she develops worsening symptoms she can always present to ED.

## 2025-07-22 ENCOUNTER — PREP FOR PROCEDURE (OUTPATIENT)
Dept: CARDIOLOGY CLINIC | Facility: CLINIC | Age: 70
End: 2025-07-22

## 2025-07-22 DIAGNOSIS — I47.10 SVT (SUPRAVENTRICULAR TACHYCARDIA) (HCC): ICD-10-CM

## 2025-07-22 DIAGNOSIS — I10 PRIMARY HYPERTENSION: ICD-10-CM

## 2025-07-22 DIAGNOSIS — I48.0 PAROXYSMAL ATRIAL FIBRILLATION (HCC): Primary | ICD-10-CM

## 2025-07-22 NOTE — ADDENDUM NOTE
Addended by: DONA PRITCHARD on: 7/22/2025 12:53 PM     Modules accepted: Orders     Statement Selected

## 2025-07-22 NOTE — TELEPHONE ENCOUNTER
Patient scheduled for JULIAN / AFIB  Ablation / PFA on 9/15/25 at [facility] with Dr. NAVARRO.      Instructions sent to patient through [employer] [facility].      Patient aware of all general instructions.    Medication holds:   Xarelto - Do not take the morning of procedure.     Lisinopril - Do not take day before and morning of procedure.      Labs to be done BETWEEN 8/15/25 & 9/8/25 (FAXED LABS TO [facility])  CMP / CBC (FASTING 8 HOURS)      Thank you,  Amy Olson

## 2025-07-22 NOTE — TELEPHONE ENCOUNTER
Caller: Maria D Palomo    Doctor: Dr. Davis    Reason for call: Calling back to schedule her ablation.  Please call patient back     Call back#: [phone]

## 2025-07-25 ENCOUNTER — TELEPHONE (OUTPATIENT)
Dept: CARDIOLOGY CLINIC | Facility: CLINIC | Age: 70
End: 2025-07-25

## 2025-07-25 NOTE — TELEPHONE ENCOUNTER
Called pt & LVM that she should hold flecainide (TAMBOCOR) & metoprolol succinate (TOPROL-XL)  for 3 days prior to her procedure per Dr. Davis.

## 2025-07-29 NOTE — TELEPHONE ENCOUNTER
Pt is concerned she will go into afib over the weekend, she would like to know how long she can be in afib for until she has to call somebody.

## 2025-07-29 NOTE — TELEPHONE ENCOUNTER
Pt is concerned about not taking her flecainide the weekend of her procedure as she was told to hold for 3 days. Pt stated she goes back into afib after 2 days of not taking the Flecainide with her HR being in the 140's, she does not wish to report to the hospital so she will reschedule her procedure

## 2025-07-30 NOTE — TELEPHONE ENCOUNTER
I called & spoke with pt. Rescheduled her afib ablation with Dr. Davis for 9/24/25. Pt will take her flecainide (TAMBOCOR) on Frederic morning but will not take Sunday night, Monday or Tuesday. If pt doesn't feel well with skipping her Sunday night does she will be calling Monday morning for further instructions.

## 2025-07-30 NOTE — TELEPHONE ENCOUNTER
Dr. Davis, please advise on the message below about holding her   flecainide (TAMBOCOR)  for 3 days prior to her procedure. And wanting to reschedule.   Thank you,   Amy

## 2025-08-02 ENCOUNTER — HOSPITAL ENCOUNTER (OUTPATIENT)
Dept: CT IMAGING | Facility: HOSPITAL | Age: 70
Discharge: HOME | End: 2025-08-02
Attending: INTERNAL MEDICINE
Payer: COMMERCIAL

## 2025-08-02 DIAGNOSIS — I47.10 SVT (SUPRAVENTRICULAR TACHYCARDIA) (HCC): ICD-10-CM

## 2025-08-02 DIAGNOSIS — I48.0 PAROXYSMAL ATRIAL FIBRILLATION (HCC): ICD-10-CM

## 2025-08-02 DIAGNOSIS — I10 PRIMARY HYPERTENSION: ICD-10-CM

## 2025-08-02 PROCEDURE — 75572 CT HRT W/3D IMAGE: CPT

## 2025-08-02 RX ADMIN — IOHEXOL 100 ML: 350 INJECTION, SOLUTION INTRAVENOUS at 12:43
